# Patient Record
Sex: FEMALE | Race: WHITE | NOT HISPANIC OR LATINO | Employment: OTHER | URBAN - METROPOLITAN AREA
[De-identification: names, ages, dates, MRNs, and addresses within clinical notes are randomized per-mention and may not be internally consistent; named-entity substitution may affect disease eponyms.]

---

## 2017-12-22 ENCOUNTER — OFFICE VISIT (OUTPATIENT)
Dept: URGENT CARE | Facility: CLINIC | Age: 82
End: 2017-12-22
Payer: MEDICARE

## 2017-12-22 VITALS
RESPIRATION RATE: 16 BRPM | DIASTOLIC BLOOD PRESSURE: 66 MMHG | WEIGHT: 134 LBS | BODY MASS INDEX: 27.01 KG/M2 | TEMPERATURE: 97 F | OXYGEN SATURATION: 96 % | HEIGHT: 59 IN | SYSTOLIC BLOOD PRESSURE: 169 MMHG | HEART RATE: 68 BPM

## 2017-12-22 DIAGNOSIS — J04.0 LARYNGITIS: Primary | ICD-10-CM

## 2017-12-22 DIAGNOSIS — R05.9 COUGH: ICD-10-CM

## 2017-12-22 LAB
CTP QC/QA: YES
FLUAV AG NPH QL: NEGATIVE
FLUBV AG NPH QL: NEGATIVE

## 2017-12-22 PROCEDURE — 96372 THER/PROPH/DIAG INJ SC/IM: CPT | Mod: S$GLB,,, | Performed by: FAMILY MEDICINE

## 2017-12-22 PROCEDURE — 87804 INFLUENZA ASSAY W/OPTIC: CPT | Mod: QW,S$GLB,, | Performed by: FAMILY MEDICINE

## 2017-12-22 PROCEDURE — 99203 OFFICE O/P NEW LOW 30 MIN: CPT | Mod: 25,S$GLB,, | Performed by: FAMILY MEDICINE

## 2017-12-22 RX ORDER — SIMVASTATIN 20 MG/1
20 TABLET, FILM COATED ORAL NIGHTLY
COMMUNITY

## 2017-12-22 RX ORDER — ACETAMINOPHEN 500 MG
500 TABLET ORAL EVERY 6 HOURS PRN
COMMUNITY

## 2017-12-22 RX ORDER — LEVOTHYROXINE SODIUM 25 UG/1
25 TABLET ORAL DAILY
COMMUNITY

## 2017-12-22 RX ORDER — DEXAMETHASONE SODIUM PHOSPHATE 100 MG/10ML
6 INJECTION INTRAMUSCULAR; INTRAVENOUS
Status: COMPLETED | OUTPATIENT
Start: 2017-12-22 | End: 2017-12-22

## 2017-12-22 RX ORDER — LOSARTAN POTASSIUM 100 MG/1
100 TABLET ORAL DAILY
COMMUNITY

## 2017-12-22 RX ORDER — DOXEPIN HYDROCHLORIDE 10 MG/ML
SOLUTION ORAL NIGHTLY
COMMUNITY

## 2017-12-22 RX ORDER — PREDNISONE 10 MG/1
10 TABLET ORAL DAILY
Qty: 7 TABLET | Refills: 0 | Status: SHIPPED | OUTPATIENT
Start: 2017-12-22 | End: 2017-12-29

## 2017-12-22 RX ORDER — ATENOLOL 25 MG/1
25 TABLET ORAL DAILY
COMMUNITY

## 2017-12-22 RX ADMIN — DEXAMETHASONE SODIUM PHOSPHATE 6 MG: 100 INJECTION INTRAMUSCULAR; INTRAVENOUS at 10:12

## 2017-12-22 NOTE — PATIENT INSTRUCTIONS
Laryngitis    Laryngitis is a swelling of the tissues around the vocal cords. Symptoms include a hoarse (scratchy) voice. The voice may be lost completely. It may be caused by a viral illness, such as a head or chest cold. It may also be due to overuse and strain of the voice. Smoking, drinking alcohol, acid reflux, allergies, or inhaling harsh chemicals may also lead to symptoms. This condition will usually resolve in 1-2 weeks.  Home care  · Rest your voice until it recovers. Talk as little as possible. If your symptoms are severe, rest at home for a day or so.  · Breathing cool steam from a humidifier/vaporizer or in a steamy shower may be helpful.  · Drink plenty of fluids to stay well hydrated.  · Do not smoke  Follow-up care  Follow up with your healthcare provider or this facility if you have not improved after one week.  When to seek medical advice  Contact your healthcare provider for any of the following:  · Severe pain with swallowing  · Trouble opening mouth  · Neck swelling, neck pain, or trouble moving neck  · Noisy breathing or trouble breathing  · Fever of 100.4°F (38.ºC) or higher, or as directed by your healthcare provider  · Drooling  · Symptoms do not resolve in 2 weeks  Date Last Reviewed: 4/26/2015 © 2000-2017 [employer]. [address] [place] [address], [place] [address]. All rights reserved. This information is not intended as a substitute for professional medical care. Always follow your healthcare professional's instructions.      Follow up with your doctor in a few days as needed.  Return to the urgent care or go to the ER if symptoms get worse.    Matt Miller MD

## 2017-12-22 NOTE — PROGRESS NOTES
"Subjective:       Patient ID: Bhumika Pitt is a 89 y.o. female.    Vitals:  height is 4' 11" (1.499 m) and weight is 60.8 kg (134 lb). Her temperature is 97.2 °F (36.2 °C). Her blood pressure is 169/66 (abnormal) and her pulse is 68. Her respiration is 16 and oxygen saturation is 96%.     Chief Complaint: Cough    Pt states she started with cough and congestion on Sunday and began yesterday with the voice horseness      Cough   This is a new problem. The current episode started in the past 7 days. The problem has been unchanged. The problem occurs hourly. The cough is productive of sputum. Pertinent negatives include no chest pain, chills, ear pain, eye redness, fever, headaches, myalgias, sore throat, shortness of breath or wheezing. Nothing aggravates the symptoms. She has tried nothing for the symptoms.     Review of Systems   Constitution: Negative for chills, fever and malaise/fatigue.   HENT: Positive for hoarse voice. Negative for congestion, ear pain and sore throat.    Eyes: Negative for discharge and redness.   Cardiovascular: Negative for chest pain, dyspnea on exertion and leg swelling.   Respiratory: Positive for cough and sputum production. Negative for shortness of breath and wheezing.    Musculoskeletal: Negative for myalgias.   Gastrointestinal: Negative for abdominal pain and nausea.   Neurological: Negative for headaches.       Objective:      Physical Exam   Constitutional: She is oriented to person, place, and time. She appears well-developed and well-nourished.   HENT:   Head: Normocephalic and atraumatic.   Right Ear: External ear normal.   Left Ear: External ear normal.   Mouth/Throat: Oropharynx is clear and moist.   Eyes: EOM are normal. Pupils are equal, round, and reactive to light.   Neck: Normal range of motion. Neck supple. No JVD present. No tracheal deviation present. No thyromegaly present.   Cardiovascular: Normal rate, regular rhythm and normal heart sounds.  Exam reveals no " gallop and no friction rub.    No murmur heard.  Pulmonary/Chest: Breath sounds normal. No respiratory distress. She has no wheezes. She has no rales. She exhibits no tenderness.   Abdominal: Soft. Bowel sounds are normal. She exhibits no distension and no mass. There is no tenderness. There is no rebound and no guarding. No hernia.   Musculoskeletal: Normal range of motion. She exhibits no edema, tenderness or deformity.   Lymphadenopathy:     She has no cervical adenopathy.   Neurological: She is alert and oriented to person, place, and time. She displays normal reflexes. No cranial nerve deficit. She exhibits normal muscle tone. Coordination normal.   Skin: Skin is warm. Capillary refill takes less than 2 seconds. No rash noted. No erythema. No pallor.   Psychiatric: She has a normal mood and affect. Her behavior is normal. Judgment and thought content normal.   Vitals reviewed.      Assessment:       1. Laryngitis    2. Cough        Plan:         Laryngitis  -     dexamethasone injection 6 mg; Inject 0.6 mLs (6 mg total) into the muscle one time.  -     predniSONE (DELTASONE) 10 MG tablet; Take 1 tablet (10 mg total) by mouth once daily.  Dispense: 7 tablet; Refill: 0    Cough  -     POCT Influenza A/B      Follow up with your doctor in a few days as needed.  Return to the urgent care or go to the ER if symptoms get worse.    Matt Miller MD

## 2018-11-30 ENCOUNTER — OFFICE VISIT (OUTPATIENT)
Dept: URGENT CARE | Facility: CLINIC | Age: 83
End: 2018-11-30
Payer: MEDICARE

## 2018-11-30 VITALS
HEART RATE: 73 BPM | WEIGHT: 133 LBS | TEMPERATURE: 99 F | HEIGHT: 59 IN | OXYGEN SATURATION: 95 % | SYSTOLIC BLOOD PRESSURE: 132 MMHG | BODY MASS INDEX: 26.81 KG/M2 | RESPIRATION RATE: 19 BRPM | DIASTOLIC BLOOD PRESSURE: 69 MMHG

## 2018-11-30 DIAGNOSIS — R09.81 SINUS CONGESTION: ICD-10-CM

## 2018-11-30 DIAGNOSIS — J06.9 UPPER RESPIRATORY TRACT INFECTION, UNSPECIFIED TYPE: Primary | ICD-10-CM

## 2018-11-30 PROCEDURE — 99214 OFFICE O/P EST MOD 30 MIN: CPT | Mod: 25,S$GLB,, | Performed by: FAMILY MEDICINE

## 2018-11-30 PROCEDURE — 96372 THER/PROPH/DIAG INJ SC/IM: CPT | Mod: S$GLB,,, | Performed by: FAMILY MEDICINE

## 2018-11-30 RX ORDER — DEXAMETHASONE SODIUM PHOSPHATE 100 MG/10ML
6 INJECTION INTRAMUSCULAR; INTRAVENOUS
Status: COMPLETED | OUTPATIENT
Start: 2018-11-30 | End: 2018-11-30

## 2018-11-30 RX ORDER — AZELASTINE 1 MG/ML
1 SPRAY, METERED NASAL 2 TIMES DAILY
Qty: 30 ML | Refills: 0 | Status: SHIPPED | OUTPATIENT
Start: 2018-11-30 | End: 2018-12-30

## 2018-11-30 RX ADMIN — DEXAMETHASONE SODIUM PHOSPHATE 6 MG: 100 INJECTION INTRAMUSCULAR; INTRAVENOUS at 03:11

## 2018-11-30 NOTE — PATIENT INSTRUCTIONS
Preventing Common Respiratory Infections  Respiratory infections such as colds and influenza (the flu) are common in winter. These infections are often caused by viruses. They may share some symptoms, but not all respiratory infections are the same. Some make you more sick than others. You can take steps to prevent common respiratory infections. And if you get sick, you can take care of yourself to keep the infection from getting worse.    What is a cold?  · Symptoms include runny nose, coughing and sneezing, and sore throat. Cold symptoms tend to be milder than flu symptoms.  · Symptoms tend to come on slowly. They last for a few days to about a week.  · With a cold, you can still do most of the things you usually do.  What is the flu?  · Symptoms include fever, headache, fatigue, cough, sore throat, runny nose, and muscle aches. Children may have upset stomach and vomiting, but adults usually dont.  · Symptoms tend to come on quickly. Some, such as fatigue and cough, can last a few weeks.  · With the flu, you may feel worn out and not able to do normal activities.  · Its most likely NOT the flu if an adult has vomiting or diarrhea for a day or two. This so-called stomach flu is probably a GI (gastrointestinal) infection.  When the infection gets worse  Without proper care, a respiratory infection can get worse. It can lead to serious complications and death. If you arent getting better, call your healthcare provider. Complications can include:  · Bronchitis (infection of the airways that leads to shortness of breath and coughing up thick yellow or green mucus)  · Pneumonia (infection of the lungs in which fluid and mucus settle in the lungs, making breathing difficult)  · Worsening of chronic conditions such as heart failure, chronic lung disease, asthma, or diabetes  · Severe dehydration (loss of fluids)  · Sinus problems  · Ear infections   Get a flu vaccine  A flu vaccine protects you from influenza  (but not other colds or infections). Get a vaccine each fall, before flu season starts. This can be done at a clinic, healthcare providers office, drugstore, senior center, or through your workplace.  Get pneumococcal vaccines  Pneumonia can be a complication of influenza. There are 2 pneumococcal pneumonia vaccines that protect against many types of pneumonia. Talk with your healthcare provider about these important vaccines.   Keep germs from spreading  No one likes getting sick. To protect yourself and others from cold and flu germs:  · Wash your hands often. Use alcohol-based hand  when you dont have access to soap and water.  · Dont touch your eyes, nose, and mouth. This may help you keep germs out of your body.  · Try to avoid people with respiratory infections. You may want to stay out of crowds during flu season (winter).  · Ask your healthcare provider if you should get a pneumonia vaccination.  How to wash your hands  · Use warm water and plenty of soap. Work up a good lather.  · Clean your whole hand, under your nails, between your fingers, and up your wrists. Wash for at least 15 to 20 seconds. Dont just wipe--rub well.  · Rinse. Let the water run down your fingertips, not up your wrists.  · In a public restroom, use a paper towel to turn off the faucet and open the door.   Date Last Reviewed: 12/1/2016  © 7087-9983 [employer]. [address] [place] [address], [place] [address]. All rights reserved. This information is not intended as a substitute for professional medical care. Always follow your healthcare professional's instructions.      Follow up with your doctor in a few days as needed.  Return to the urgent care or go to the ER if symptoms get worse.    Matt Miller MD

## 2018-11-30 NOTE — PROGRESS NOTES
"Subjective:       Patient ID: Bhumika Pitt is a 89 y.o. female.    Vitals:  height is 4' 11" (1.499 m) and weight is 60.3 kg (133 lb). Her oral temperature is 98.5 °F (36.9 °C). Her blood pressure is 132/69 and her pulse is 73. Her respiration is 19 and oxygen saturation is 95%.     Chief Complaint: URI    URI    This is a new problem. The current episode started 1 to 4 weeks ago (1-2 weeks). The problem has been gradually worsening. There has been no fever. Associated symptoms include congestion, coughing and sneezing. Pertinent negatives include no abdominal pain, chest pain, diarrhea, dysuria, ear pain, headaches, joint pain, joint swelling, nausea, neck pain, plugged ear sensation, rash, rhinorrhea, sinus pain, sore throat, swollen glands, vomiting or wheezing. She has tried antihistamine and acetaminophen (nasal spray) for the symptoms. The treatment provided no relief.       Constitution: Negative for chills, sweating, fatigue and fever.   HENT: Positive for congestion. Negative for ear pain, sinus pain, sinus pressure, sore throat and voice change.    Neck: Negative for neck pain and painful lymph nodes.   Cardiovascular: Negative for chest pain.   Eyes: Negative for eye redness.   Respiratory: Positive for cough. Negative for chest tightness, sputum production, bloody sputum, COPD, shortness of breath, stridor, wheezing and asthma.    Gastrointestinal: Negative for abdominal pain, nausea, vomiting and diarrhea.   Genitourinary: Negative for dysuria.   Musculoskeletal: Negative for muscle ache.   Skin: Negative for rash and erythema.   Allergic/Immunologic: Positive for sneezing. Negative for seasonal allergies and asthma.   Neurological: Negative for headaches.   Hematologic/Lymphatic: Negative for swollen lymph nodes.       Objective:      Physical Exam   Constitutional: She is oriented to person, place, and time. She appears well-developed and well-nourished.   HENT:   Head: Normocephalic and atraumatic. "   Eyes: EOM are normal. Pupils are equal, round, and reactive to light.   Neck: Normal range of motion. Neck supple.   Cardiovascular: Normal rate, regular rhythm and normal heart sounds.   No murmur heard.  Pulmonary/Chest: Breath sounds normal. No respiratory distress. She has no wheezes. She has no rales.   Abdominal: Soft. Bowel sounds are normal. She exhibits no distension. There is no tenderness. There is no rebound and no guarding.   Musculoskeletal: Normal range of motion. She exhibits no edema, tenderness or deformity.   Lymphadenopathy:     She has no cervical adenopathy.   Neurological: She is alert and oriented to person, place, and time. No cranial nerve deficit. Coordination normal.   Skin: Skin is warm. Capillary refill takes less than 2 seconds. No rash noted. No erythema. No pallor.   Psychiatric: She has a normal mood and affect. Her behavior is normal. Judgment and thought content normal.   Vitals reviewed.      Assessment:       1. Upper respiratory tract infection, unspecified type    2. Sinus congestion        Plan:         Upper respiratory tract infection, unspecified type    Sinus congestion  -     dexamethasone injection 6 mg  -     azelastine (ASTELIN) 137 mcg (0.1 %) nasal spray; 1 spray (137 mcg total) by Nasal route 2 (two) times daily.  Dispense: 30 mL; Refill: 0          Patient Instructions       Preventing Common Respiratory Infections  Respiratory infections such as colds and influenza (the flu) are common in winter. These infections are often caused by viruses. They may share some symptoms, but not all respiratory infections are the same. Some make you more sick than others. You can take steps to prevent common respiratory infections. And if you get sick, you can take care of yourself to keep the infection from getting worse.    What is a cold?  · Symptoms include runny nose, coughing and sneezing, and sore throat. Cold symptoms tend to be milder than flu symptoms.  · Symptoms  tend to come on slowly. They last for a few days to about a week.  · With a cold, you can still do most of the things you usually do.  What is the flu?  · Symptoms include fever, headache, fatigue, cough, sore throat, runny nose, and muscle aches. Children may have upset stomach and vomiting, but adults usually dont.  · Symptoms tend to come on quickly. Some, such as fatigue and cough, can last a few weeks.  · With the flu, you may feel worn out and not able to do normal activities.  · Its most likely NOT the flu if an adult has vomiting or diarrhea for a day or two. This so-called stomach flu is probably a GI (gastrointestinal) infection.  When the infection gets worse  Without proper care, a respiratory infection can get worse. It can lead to serious complications and death. If you arent getting better, call your healthcare provider. Complications can include:  · Bronchitis (infection of the airways that leads to shortness of breath and coughing up thick yellow or green mucus)  · Pneumonia (infection of the lungs in which fluid and mucus settle in the lungs, making breathing difficult)  · Worsening of chronic conditions such as heart failure, chronic lung disease, asthma, or diabetes  · Severe dehydration (loss of fluids)  · Sinus problems  · Ear infections   Get a flu vaccine  A flu vaccine protects you from influenza (but not other colds or infections). Get a vaccine each fall, before flu season starts. This can be done at a clinic, healthcare providers office, drugstore, senior center, or through your workplace.  Get pneumococcal vaccines  Pneumonia can be a complication of influenza. There are 2 pneumococcal pneumonia vaccines that protect against many types of pneumonia. Talk with your healthcare provider about these important vaccines.   Keep germs from spreading  No one likes getting sick. To protect yourself and others from cold and flu germs:  · Wash your hands often. Use alcohol-based hand   when you dont have access to soap and water.  · Dont touch your eyes, nose, and mouth. This may help you keep germs out of your body.  · Try to avoid people with respiratory infections. You may want to stay out of crowds during flu season (winter).  · Ask your healthcare provider if you should get a pneumonia vaccination.  How to wash your hands  · Use warm water and plenty of soap. Work up a good lather.  · Clean your whole hand, under your nails, between your fingers, and up your wrists. Wash for at least 15 to 20 seconds. Dont just wipe--rub well.  · Rinse. Let the water run down your fingertips, not up your wrists.  · In a public restroom, use a paper towel to turn off the faucet and open the door.   Date Last Reviewed: 12/1/2016  © 6481-1045 [employer]. [address] [place] [address]. All rights reserved. This information is not intended as a substitute for professional medical care. Always follow your healthcare professional's instructions.      Follow up with your doctor in a few days as needed.  Return to the urgent care or go to the ER if symptoms get worse.    Matt Miller MD

## 2019-05-27 ENCOUNTER — OFFICE VISIT (OUTPATIENT)
Dept: URGENT CARE | Facility: CLINIC | Age: 84
End: 2019-05-27
Payer: MEDICARE

## 2019-05-27 VITALS
BODY MASS INDEX: 26.11 KG/M2 | RESPIRATION RATE: 19 BRPM | HEIGHT: 60 IN | WEIGHT: 133 LBS | SYSTOLIC BLOOD PRESSURE: 160 MMHG | OXYGEN SATURATION: 95 % | DIASTOLIC BLOOD PRESSURE: 90 MMHG | TEMPERATURE: 98 F | HEART RATE: 65 BPM

## 2019-05-27 DIAGNOSIS — R09.82 POST-NASAL DRIP: Primary | ICD-10-CM

## 2019-05-27 PROCEDURE — 96372 PR INJECTION,THERAP/PROPH/DIAG2ST, IM OR SUBCUT: ICD-10-PCS | Mod: S$GLB,,, | Performed by: PHYSICIAN ASSISTANT

## 2019-05-27 PROCEDURE — 99213 OFFICE O/P EST LOW 20 MIN: CPT | Mod: 25,S$GLB,, | Performed by: PHYSICIAN ASSISTANT

## 2019-05-27 PROCEDURE — 96372 THER/PROPH/DIAG INJ SC/IM: CPT | Mod: S$GLB,,, | Performed by: PHYSICIAN ASSISTANT

## 2019-05-27 PROCEDURE — 99213 PR OFFICE/OUTPT VISIT, EST, LEVL III, 20-29 MIN: ICD-10-PCS | Mod: 25,S$GLB,, | Performed by: PHYSICIAN ASSISTANT

## 2019-05-27 RX ORDER — AZELASTINE 1 MG/ML
2 SPRAY, METERED NASAL 2 TIMES DAILY PRN
Qty: 30 ML | Refills: 0 | Status: SHIPPED | OUTPATIENT
Start: 2019-05-27 | End: 2019-07-28

## 2019-05-27 RX ORDER — DEXAMETHASONE SODIUM PHOSPHATE 4 MG/ML
6 INJECTION, SOLUTION INTRA-ARTICULAR; INTRALESIONAL; INTRAMUSCULAR; INTRAVENOUS; SOFT TISSUE ONCE
Status: COMPLETED | OUTPATIENT
Start: 2019-05-27 | End: 2019-05-27

## 2019-05-27 RX ADMIN — DEXAMETHASONE SODIUM PHOSPHATE 6 MG: 4 INJECTION, SOLUTION INTRA-ARTICULAR; INTRALESIONAL; INTRAMUSCULAR; INTRAVENOUS; SOFT TISSUE at 11:05

## 2019-05-27 NOTE — PROGRESS NOTES
Subjective:       Patient ID: Bhumika Pitt is a 89 y.o. female.    Vitals:  height is 5' (1.524 m) and weight is 60.3 kg (133 lb). Her oral temperature is 97.8 °F (36.6 °C). Her blood pressure is 160/90 (abnormal) and her pulse is 65. Her respiration is 19 and oxygen saturation is 95%.     Chief Complaint: Sinusitis    Sinusitis   This is a new problem. The current episode started yesterday. The problem has been gradually worsening since onset. There has been no fever. Her pain is at a severity of 0/10. She is experiencing no pain. Associated symptoms include congestion, coughing, ear pain and sinus pressure. Pertinent negatives include no chills, diaphoresis, headaches, hoarse voice, neck pain, shortness of breath, sneezing, sore throat or swollen glands. Past treatments include saline sprays.       Constitution: Negative for chills, sweating, fatigue and fever.   HENT: Positive for ear pain, congestion, postnasal drip, sinus pain and sinus pressure. Negative for sore throat and voice change.    Neck: Negative for neck pain and painful lymph nodes.   Eyes: Negative for eye redness.   Respiratory: Positive for cough. Negative for chest tightness, sputum production, bloody sputum, COPD, shortness of breath, stridor, wheezing and asthma.    Gastrointestinal: Negative for nausea and vomiting.   Musculoskeletal: Negative for muscle ache.   Skin: Negative for rash.   Allergic/Immunologic: Negative for seasonal allergies, asthma and sneezing.   Neurological: Negative for headaches.   Hematologic/Lymphatic: Negative for swollen lymph nodes.       Objective:      Physical Exam   Constitutional: She is oriented to person, place, and time. She appears well-developed and well-nourished. She is cooperative.  Non-toxic appearance. She does not appear ill. No distress.   HENT:   Head: Normocephalic and atraumatic.   Right Ear: Hearing, tympanic membrane, external ear and ear canal normal.   Left Ear: Hearing, tympanic membrane,  external ear and ear canal normal.   Nose: Nose normal. No mucosal edema, rhinorrhea or nasal deformity. No epistaxis. Right sinus exhibits no maxillary sinus tenderness and no frontal sinus tenderness. Left sinus exhibits no maxillary sinus tenderness and no frontal sinus tenderness.   Mouth/Throat: Uvula is midline and mucous membranes are normal. No trismus in the jaw. Normal dentition. No uvula swelling. No posterior oropharyngeal erythema.   Posterior oropharynx with cobblestoning. No significant oropharyngeal erythema or edema. Airway patent without stridor. Nasopharynx dry. Bilateral TMs and ear canals wnl without effusion, erythema, perforation, dullness, or loss of landmarks.   Eyes: Conjunctivae and lids are normal. Right eye exhibits no discharge. Left eye exhibits no discharge. No scleral icterus.   Sclera clear bilat   Neck: Trachea normal, normal range of motion, full passive range of motion without pain and phonation normal. Neck supple.   Cardiovascular: Normal rate, regular rhythm, normal heart sounds, intact distal pulses and normal pulses.   Pulmonary/Chest: Effort normal and breath sounds normal. No respiratory distress.   Abdominal: Soft. Normal appearance and bowel sounds are normal. She exhibits no distension, no pulsatile midline mass and no mass. There is no tenderness.   Musculoskeletal: Normal range of motion. She exhibits no edema or deformity.   Neurological: She is alert and oriented to person, place, and time. She exhibits normal muscle tone. Coordination normal.   Skin: Skin is warm, dry and intact. She is not diaphoretic. No pallor.   Psychiatric: She has a normal mood and affect. Her speech is normal and behavior is normal. Judgment and thought content normal. Cognition and memory are normal.   Nursing note and vitals reviewed.      Assessment:       1. Post-nasal drip        Plan:         Chronic rhinitis with post nasal drip. States 6 dexamethasone helps with symptoms. I feel  amenable to her plan. No corticosteroids in >60 days. PCP f/u.     Post-nasal drip    Other orders  -     dexamethasone injection 6 mg  -     azelastine (ASTELIN) 137 mcg (0.1 %) nasal spray; 2 sprays (274 mcg total) by Nasal route 2 (two) times daily as needed for Rhinitis.  Dispense: 30 mL; Refill: 0

## 2019-05-27 NOTE — PATIENT INSTRUCTIONS
Use Astelin as prescribed. Follow-up with your primary care provider for reevaluation should symptoms persist despite treatment. Return to this urgent care if you experience severe headache, if you begin with fever, if any other problems occur.   Sinus Headaches     When using nasal spray, keep your chin down and angle the spray away from center.     Sinus headaches can cause a gnawing pain behind the nose and eyes. The pain most often gets worse in the afternoon and evening. You may also run a fever. Sinus headaches are caused by colds or allergies that make the nasal passages inflamed or infected.  To help prevent sinus headaches:  · Treat colds promptly to keep mucus from backing up.  · Avoid things that trigger sinus problems, such as pollens, dust, smoke, fumes, and strong odors.  · Take allergy medicines as directed by your healthcare provider.  To relieve the pain:  · Keep your sinuses open and free of mucus. Try over-the-counter sinus rinse products.  · Use a nasal decongestant as directed to reduce the inflammation.  · Drink fluids to keep the mucus thinner. This helps it drain more easily. You can also use a humidifier.  · Apply hot packs to the area around your sinuses. Use a hot water bottle.  · See your healthcare provider if your sinus headache lasts more than 2 weeks. You may need medicine for a sinus infection or an exam to check for other headache conditions, like migraines.  Date Last Reviewed: 10/1/2016  © 4127-8862 [employer] [place]. [address] [place] [address]. All rights reserved. This information is not intended as a substitute for professional medical care. Always follow your healthcare professional's instructions.        Allergic Rhinitis  Allergic rhinitis is an allergic reaction that affects the nose, and often the eyes. Its often known as nasal allergies. Nasal allergies are often due to things in the environment that are breathed in. Depending what you are  sensitive to, nasal allergies may occur only during certain seasons. Or they may occur year round. Common indoor allergens include house dust mites, mold, cockroaches, and pet dander. Outdoor allergens include pollen from trees, grasses, and weeds.   Symptoms include a drippy, stuffy, and itchy nose. They also include sneezing and red and itchy eyes. You may feel tired more often. Severe allergies may also affect your breathing and trigger a condition called asthma.   Tests can be done to see what allergens are affecting you. You may be referred to an allergy specialist for testing and further evaluation.  Home care  Your healthcare provider may prescribe medicines to help relieve allergy symptoms. These may include oral medicines, nasal sprays, or eye drops.  Ask your provider for advice on how to avoid substances that you are allergic to. Below are a few tips for each type of allergen.  Pet dander:  · Do not have pets with fur and feathers.  · If you can't avoid having a pet, keep it out of your bedroom and off upholstered furniture.  Pollen:  · When pollen counts are high, keep windows of your car and home closed. If possible, use an air conditioner instead.  · Wear a filter mask when mowing or doing yard work.  House dust mites:  · Wash bedding every week in warm water and detergent and dry on a hot setting.  · Cover the mattress, box spring, and pillows with allergy covers.   · If possible, sleep in a room with no carpet, curtains, or upholstered furniture.  Cockroaches:  · Store food in sealed containers.  · Remove garbage from the home promptly.  · Fix water leaks  Mold:  · Keep humidity low by using a dehumidifier or air conditioner. Keep the dehumidifier and air conditioner clean and free of mold.  · Clean moldy areas with bleach and water.  In general:  · Vacuum once or twice a week. If possible, use a vacuum with a high-efficiency particulate air (HEPA) filter.  · Do not smoke. Avoid cigarette smoke.  Cigarette smoke is an irritant that can make symptoms worse.  Follow-up care  Follow up as advised by the healthcare provider or our staff. If you were referred to an allergy specialist, make this appointment promptly.  When to seek medical advice  Call your healthcare provider right away if the following occur:  · Coughing or wheezing  · Fever greater than 100.4°F (38°C)  · Hives (raised red bumps)  · Continuing symptoms, new symptoms, or worsening symptoms  Call 911 right away if you have:  · Trouble breathing  · Severe swelling of the face or severe itching of the eyes or mouth  Date Last Reviewed: 3/1/2017  © 0656-3540 [employer]. [address] [place] [address], [place] [address]. All rights reserved. This information is not intended as a substitute for professional medical care. Always follow your healthcare professional's instructions.

## 2019-07-28 ENCOUNTER — OFFICE VISIT (OUTPATIENT)
Dept: URGENT CARE | Facility: CLINIC | Age: 84
End: 2019-07-28
Payer: MEDICARE

## 2019-07-28 VITALS
OXYGEN SATURATION: 97 % | TEMPERATURE: 98 F | WEIGHT: 133 LBS | DIASTOLIC BLOOD PRESSURE: 67 MMHG | RESPIRATION RATE: 18 BRPM | HEART RATE: 70 BPM | SYSTOLIC BLOOD PRESSURE: 148 MMHG | BODY MASS INDEX: 26.11 KG/M2 | HEIGHT: 60 IN

## 2019-07-28 DIAGNOSIS — R09.81 NASAL SINUS CONGESTION: ICD-10-CM

## 2019-07-28 DIAGNOSIS — H60.92 OTITIS EXTERNA OF LEFT EAR, UNSPECIFIED CHRONICITY, UNSPECIFIED TYPE: Primary | ICD-10-CM

## 2019-07-28 PROCEDURE — 99214 PR OFFICE/OUTPT VISIT, EST, LEVL IV, 30-39 MIN: ICD-10-PCS | Mod: S$GLB,,, | Performed by: PHYSICIAN ASSISTANT

## 2019-07-28 PROCEDURE — 99214 OFFICE O/P EST MOD 30 MIN: CPT | Mod: S$GLB,,, | Performed by: PHYSICIAN ASSISTANT

## 2019-07-28 RX ORDER — AZELASTINE 1 MG/ML
1 SPRAY, METERED NASAL 2 TIMES DAILY
Qty: 30 ML | Refills: 0 | Status: SHIPPED | OUTPATIENT
Start: 2019-07-28 | End: 2019-08-27

## 2019-07-28 RX ORDER — OFLOXACIN 3 MG/ML
5 SOLUTION AURICULAR (OTIC) DAILY
Qty: 5 ML | Refills: 0 | Status: SHIPPED | OUTPATIENT
Start: 2019-07-28 | End: 2019-11-08

## 2019-07-28 RX ORDER — PREDNISONE 10 MG/1
10 TABLET ORAL DAILY
Qty: 3 TABLET | Refills: 0 | Status: SHIPPED | OUTPATIENT
Start: 2019-07-28 | End: 2019-07-31

## 2019-07-28 NOTE — PROGRESS NOTES
Subjective:       Patient ID: Bhumika Pitt is a 89 y.o. female.    Vitals:  height is 5' (1.524 m) and weight is 60.3 kg (133 lb). Her temperature is 98.2 °F (36.8 °C). Her blood pressure is 148/67 (abnormal) and her pulse is 70. Her respiration is 18 and oxygen saturation is 97%.     Chief Complaint: Sinus Problem (ear congestion, post nasal drip x 1 day )    Patient reports nasal congestion and sinus pressure for 1 day.  She has been using nasal spray that was given to her fear 0 with little relief.  She reports pressure in bilateral ears but denies any pain or discharge. She denies sore throat, fever, abdominal pain, nausea or vomiting, runny nose, cough or shortness of breath.  Patient all also takes daily Zyrtec.  She is requesting another steroid injection since that helped her when she had the symptoms in May of this year.    Sinus Problem   This is a new problem. The current episode started yesterday. The problem is unchanged. There has been no fever. Her pain is at a severity of 5/10. The pain is mild. Associated symptoms include congestion, ear pain and sinus pressure. Pertinent negatives include no chills, coughing, diaphoresis, headaches, shortness of breath or sore throat. Treatments tried: Astelin. The treatment provided no relief.       Constitution: Negative for chills, sweating, fatigue and fever.   HENT: Positive for ear pain, congestion and sinus pressure. Negative for ear discharge, sinus pain, sore throat and voice change.    Neck: Negative for painful lymph nodes.   Eyes: Negative for eye redness.   Respiratory: Negative for chest tightness, cough, sputum production, bloody sputum, COPD, shortness of breath, stridor, wheezing and asthma.    Gastrointestinal: Negative for abdominal pain, nausea and vomiting.   Musculoskeletal: Negative for muscle ache.   Skin: Negative for rash.   Allergic/Immunologic: Negative for seasonal allergies and asthma.   Neurological: Negative for headaches.    Hematologic/Lymphatic: Negative for swollen lymph nodes.       Objective:      Physical Exam   Constitutional: She is oriented to person, place, and time. She appears well-developed and well-nourished. She is cooperative.  Non-toxic appearance. She does not appear ill. No distress.   HENT:   Head: Normocephalic and atraumatic.   Right Ear: Hearing, tympanic membrane, external ear and ear canal normal.   Left Ear: Hearing and external ear normal. No swelling or tenderness. A middle ear effusion is present.   Nose: Mucosal edema present. No rhinorrhea or nasal deformity. No epistaxis. Right sinus exhibits no maxillary sinus tenderness and no frontal sinus tenderness. Left sinus exhibits no maxillary sinus tenderness and no frontal sinus tenderness.   Mouth/Throat: Uvula is midline, oropharynx is clear and moist and mucous membranes are normal. No trismus in the jaw. Normal dentition. No uvula swelling. No oropharyngeal exudate or posterior oropharyngeal erythema.       Purulent material in left canal.    Eyes: Conjunctivae and lids are normal. No scleral icterus.   Sclera clear bilat   Neck: Trachea normal, full passive range of motion without pain and phonation normal. Neck supple.   Cardiovascular: Normal rate, regular rhythm, normal heart sounds, intact distal pulses and normal pulses.   Pulmonary/Chest: Effort normal and breath sounds normal. No respiratory distress.   Musculoskeletal: Normal range of motion. She exhibits no edema or deformity.   Lymphadenopathy:     She has no cervical adenopathy.   Neurological: She is alert and oriented to person, place, and time. She exhibits normal muscle tone. Coordination normal.   Skin: Skin is warm, dry and intact. She is not diaphoretic. No pallor.   Psychiatric: She has a normal mood and affect. Her speech is normal and behavior is normal. Judgment and thought content normal. Cognition and memory are normal.   Nursing note and vitals reviewed.      Assessment:        1. Otitis externa of left ear, unspecified chronicity, unspecified type    2. Nasal sinus congestion        Plan:         Otitis externa of left ear, unspecified chronicity, unspecified type  -     ofloxacin (FLOXIN) 0.3 % otic solution; Place 5 drops into the left ear once daily.  Dispense: 5 mL; Refill: 0    Nasal sinus congestion  -     predniSONE (DELTASONE) 10 MG tablet; Take 1 tablet (10 mg total) by mouth once daily. for 3 days  Dispense: 3 tablet; Refill: 0  -     azelastine (ASTELIN) 137 mcg (0.1 %) nasal spray; 1 spray (137 mcg total) by Nasal route 2 (two) times daily.  Dispense: 30 mL; Refill: 0    Continue taking daily Zyrtec. Explained to pt she is unable to get another steroid injection since she had one within the last couple months. Pt should f/u with PCP if symptoms do not improve within 5-7 days.       General Instructions for URI:  Below are suggestions for symptomatic relief:  -Tylenol every 4 hours OR ibuprofen every 6 hours as needed for pain/fever.  -Salt water gargles to soothe throat pain.  -Chloroseptic spray also helps to numb throat pain.  -Nasal saline spray reduces inflammation and dryness.  -Warm face compresses to help with facial sinus pain/pressure.  -Vicks vapor rub at night.  -Flonase OTC or Nasacort OTC for nasal congestion.  -Simple foods like chicken noodle soup.  -Delsym helps with coughing at night  -Zyrtec/Claritin during the day & Benadryl at night may help with allergies.  If you DO NOT have Hypertension or any history of palpitations, it is ok to take over the counter Sudafed or Mucinex D  or Allegra-D or Claritin-D or Zyrtec-D.  If you do take one of the above, it is ok to combine that with plain over the counter Mucinex or Allegra or Claritin or  Zyrtec. If, for example, you are taking Zyrtec -D, you can combine that with Mucinex, but not Mucinex-D. If you are  taking Mucinex-D, you can combine that with plain Allegra or Claritin or Zyrtec.  If you DO have  Hypertension or palpitations, it is safe to take Coricidin HBP for relief of sinus symptoms.  Please follow up with your primary care provider within 2-5 days if your signs and symptoms have not resolved or  worsen.  If your condition worsens or fails to improve we recommend that you receive another evaluation at the emergency  room immediately or contact your primary medical clinic to discuss your concerns.  You must understand that you have received an Urgent Care treatment only and that you may be released before all of  your medical problems are known or treated. You, the patient, will arrange for follow up care as instructed.      External Ear Infection (Adult)    External otitis (also called swimmers ear) is an infection in the ear canal. It is often caused by bacteria or fungus. It can occur a few days after water gets trapped in the ear canal (from swimming or bathing). It can also occur after cleaning too deeply in the ear canal with a cotton swab or other object. Sometimes, hair care products get into the ear canal and cause this problem.  Symptoms can include pain, fever, itching, redness, drainage, or swelling of the ear canal. Temporary hearing loss may also occur.  Home care  · Do not try to clean the ear canal. This can push pus and bacteria deeper into the canal.  · Use prescribed ear drops as directed. These help reduce swelling and fight the infection. If an ear wick was placed in the ear canal, apply drops right onto the end of the wick. The wick will draw the medication into the ear canal even if it is swollen closed.  · A cotton ball may be loosely placed in the outer ear to absorb any drainage.  · You may use acetaminophen or ibuprofen to control pain, unless another medication was prescribed. Note: If you have chronic liver or kidney disease or ever had a stomach ulcer or GI bleeding, talk to your health care provider before taking any of these medications.  · Do not allow water to get into  your ear when bathing. Also, avoid swimming until the infection has cleared.  Prevention  · Keep your ears dry. This helps lower the risk of infection. Dry your ears with a towel or hair dryer after getting wet. Also, use ear plugs when swimming.  · Do not stick any objects in the ear to remove wax.  · If you feel water trapped in your ear, use ear drops right away. You can get these drops over the counter at most drugstores. They work by removing water from the ear canal.  Follow-up care  Follow up with your health care provider in one week, or as advised.  When to seek medical advice  Call your health care provider right away if any of these occur:  · Ear pain becomes worse or doesnt improve after 3 days of treatment  · Redness or swelling of the outer ear occurs or gets worse  · Headache  · Painful or stiff neck  · Drowsiness or confusion  · Fever of 100.4ºF (38ºC) or higher, or as directed by your health care provider  · Seizure  Date Last Reviewed: 3/22/2015  © 8826-8679 [employer]. [address] [place] [address]. All rights reserved. This information is not intended as a substitute for professional medical care. Always follow your healthcare professional's instructions.      Please follow up with your Primary care provider within 2-5 days if your signs and symptoms have not resolved or worsen.     If your condition worsens or fails to improve we recommend that you receive another evaluation at the emergency room immediately or contact your primary medical clinic to discuss your concerns.   You must understand that you have received an Urgent Care treatment only and that you may be released before all of your medical problems are known or treated. You, the patient, will arrange for follow up care as instructed.     RED FLAGS/WARNING SYMPTOMS DISCUSSED WITH PATIENT THAT WOULD WARRANT EMERGENT MEDICAL ATTENTION. PATIENT VERBALIZED UNDERSTANDING.

## 2019-07-28 NOTE — PATIENT INSTRUCTIONS
General Instructions for URI:  Below are suggestions for symptomatic relief:  -Tylenol every 4 hours OR ibuprofen every 6 hours as needed for pain/fever.  -Salt water gargles to soothe throat pain.  -Chloroseptic spray also helps to numb throat pain.  -Nasal saline spray reduces inflammation and dryness.  -Warm face compresses to help with facial sinus pain/pressure.  -Vicks vapor rub at night.  -Flonase OTC or Nasacort OTC for nasal congestion.  -Simple foods like chicken noodle soup.  -Delsym helps with coughing at night  -Zyrtec/Claritin during the day & Benadryl at night may help with allergies.  If you DO NOT have Hypertension or any history of palpitations, it is ok to take over the counter Sudafed or Mucinex D  or Allegra-D or Claritin-D or Zyrtec-D.  If you do take one of the above, it is ok to combine that with plain over the counter Mucinex or Allegra or Claritin or  Zyrtec. If, for example, you are taking Zyrtec -D, you can combine that with Mucinex, but not Mucinex-D. If you are  taking Mucinex-D, you can combine that with plain Allegra or Claritin or Zyrtec.  If you DO have Hypertension or palpitations, it is safe to take Coricidin HBP for relief of sinus symptoms.  Please follow up with your primary care provider within 2-5 days if your signs and symptoms have not resolved or  worsen.  If your condition worsens or fails to improve we recommend that you receive another evaluation at the emergency  room immediately or contact your primary medical clinic to discuss your concerns.  You must understand that you have received an Urgent Care treatment only and that you may be released before all of  your medical problems are known or treated. You, the patient, will arrange for follow up care as instructed.      External Ear Infection (Adult)    External otitis (also called swimmers ear) is an infection in the ear canal. It is often caused by bacteria or fungus. It can occur a few days after water gets  trapped in the ear canal (from swimming or bathing). It can also occur after cleaning too deeply in the ear canal with a cotton swab or other object. Sometimes, hair care products get into the ear canal and cause this problem.  Symptoms can include pain, fever, itching, redness, drainage, or swelling of the ear canal. Temporary hearing loss may also occur.  Home care  · Do not try to clean the ear canal. This can push pus and bacteria deeper into the canal.  · Use prescribed ear drops as directed. These help reduce swelling and fight the infection. If an ear wick was placed in the ear canal, apply drops right onto the end of the wick. The wick will draw the medication into the ear canal even if it is swollen closed.  · A cotton ball may be loosely placed in the outer ear to absorb any drainage.  · You may use acetaminophen or ibuprofen to control pain, unless another medication was prescribed. Note: If you have chronic liver or kidney disease or ever had a stomach ulcer or GI bleeding, talk to your health care provider before taking any of these medications.  · Do not allow water to get into your ear when bathing. Also, avoid swimming until the infection has cleared.  Prevention  · Keep your ears dry. This helps lower the risk of infection. Dry your ears with a towel or hair dryer after getting wet. Also, use ear plugs when swimming.  · Do not stick any objects in the ear to remove wax.  · If you feel water trapped in your ear, use ear drops right away. You can get these drops over the counter at most drugstores. They work by removing water from the ear canal.  Follow-up care  Follow up with your health care provider in one week, or as advised.  When to seek medical advice  Call your health care provider right away if any of these occur:  · Ear pain becomes worse or doesnt improve after 3 days of treatment  · Redness or swelling of the outer ear occurs or gets worse  · Headache  · Painful or stiff  neck  · Drowsiness or confusion  · Fever of 100.4ºF (38ºC) or higher, or as directed by your health care provider  · Seizure  Date Last Reviewed: 3/22/2015  © 1639-2810 [employer]. [address] [place] [address], [place] [address]. All rights reserved. This information is not intended as a substitute for professional medical care. Always follow your healthcare professional's instructions.      Please follow up with your Primary care provider within 2-5 days if your signs and symptoms have not resolved or worsen.     If your condition worsens or fails to improve we recommend that you receive another evaluation at the emergency room immediately or contact your primary medical clinic to discuss your concerns.   You must understand that you have received an Urgent Care treatment only and that you may be released before all of your medical problems are known or treated. You, the patient, will arrange for follow up care as instructed.     RED FLAGS/WARNING SYMPTOMS DISCUSSED WITH PATIENT THAT WOULD WARRANT EMERGENT MEDICAL ATTENTION. PATIENT VERBALIZED UNDERSTANDING.

## 2019-11-08 ENCOUNTER — OFFICE VISIT (OUTPATIENT)
Dept: URGENT CARE | Facility: CLINIC | Age: 84
End: 2019-11-08
Payer: MEDICARE

## 2019-11-08 VITALS
TEMPERATURE: 97 F | SYSTOLIC BLOOD PRESSURE: 148 MMHG | HEIGHT: 60 IN | DIASTOLIC BLOOD PRESSURE: 52 MMHG | WEIGHT: 133 LBS | OXYGEN SATURATION: 99 % | RESPIRATION RATE: 16 BRPM | HEART RATE: 66 BPM | BODY MASS INDEX: 26.11 KG/M2

## 2019-11-08 DIAGNOSIS — H60.501 ACUTE OTITIS EXTERNA OF RIGHT EAR, UNSPECIFIED TYPE: Primary | ICD-10-CM

## 2019-11-08 PROCEDURE — 99214 PR OFFICE/OUTPT VISIT, EST, LEVL IV, 30-39 MIN: ICD-10-PCS | Mod: S$GLB,,, | Performed by: NURSE PRACTITIONER

## 2019-11-08 PROCEDURE — 99214 OFFICE O/P EST MOD 30 MIN: CPT | Mod: S$GLB,,, | Performed by: NURSE PRACTITIONER

## 2019-11-08 RX ORDER — NEOMYCIN SULFATE, POLYMYXIN B SULFATE, HYDROCORTISONE 3.5; 10000; 1 MG/ML; [USP'U]/ML; MG/ML
3 SOLUTION/ DROPS AURICULAR (OTIC) 3 TIMES DAILY
Qty: 10 ML | Refills: 0 | Status: SHIPPED | OUTPATIENT
Start: 2019-11-08 | End: 2019-11-18

## 2019-11-08 NOTE — PROGRESS NOTES
Subjective:       Patient ID: Bhumika Pitt is a 89 y.o. female.    Vitals:  height is 5' (1.524 m) and weight is 60.3 kg (133 lb). Her temperature is 97.3 °F (36.3 °C). Her blood pressure is 148/52 (abnormal) and her pulse is 66. Her respiration is 16 and oxygen saturation is 99%.     Chief Complaint: Cerumen Impaction    Pt has some right ear discomfort and what she believes is wax is in her ear  This is a 89-year-old female presents today with complaints of right ear pain since having wax washed out of it this week.  Patient states is very tender to touch.  Denies any fever or chills.  Is currently on steroid drops to help with canal dermatitis per patient.      Constitution: Negative for chills, fatigue and fever.   HENT: Positive for ear pain. Negative for congestion and sore throat.    Neck: Negative for painful lymph nodes.   Cardiovascular: Negative for chest pain and leg swelling.   Eyes: Negative for double vision and blurred vision.   Respiratory: Negative for cough and shortness of breath.    Gastrointestinal: Negative for nausea, vomiting and diarrhea.   Genitourinary: Negative for dysuria, frequency, urgency and history of kidney stones.   Musculoskeletal: Negative for joint pain, joint swelling, muscle cramps and muscle ache.   Skin: Negative for color change, pale, rash, erythema and bruising.   Allergic/Immunologic: Negative for seasonal allergies.   Neurological: Negative for dizziness, history of vertigo, light-headedness, passing out and headaches.   Hematologic/Lymphatic: Negative for swollen lymph nodes.   Psychiatric/Behavioral: Negative for nervous/anxious, sleep disturbance and depression. The patient is not nervous/anxious.        Objective:      Physical Exam   Constitutional: She is oriented to person, place, and time. She appears well-developed and well-nourished.   HENT:   Head: Normocephalic and atraumatic. Head is without abrasion, without contusion and without laceration.   Right  Ear: External ear normal. There is drainage (White exudate to canal), swelling ( moderate) and tenderness. No mastoid tenderness. No hemotympanum.   Left Ear: Hearing, tympanic membrane, external ear and ear canal normal.   Nose: Nose normal.   Mouth/Throat: Oropharynx is clear and moist and mucous membranes are normal.   Tender to tragus  No trismus   Eyes: Pupils are equal, round, and reactive to light. Conjunctivae, EOM and lids are normal.   Neck: Trachea normal, full passive range of motion without pain and phonation normal. Neck supple.   Cardiovascular: Normal rate, regular rhythm and normal heart sounds.   Pulmonary/Chest: Effort normal and breath sounds normal. No stridor. No respiratory distress.   Musculoskeletal: Normal range of motion.   Neurological: She is alert and oriented to person, place, and time.   Skin: Skin is warm, dry, intact and no rash. Capillary refill takes less than 2 seconds. abrasion, burn, bruising, erythema and ecchymosis  Psychiatric: She has a normal mood and affect. Her speech is normal and behavior is normal. Judgment and thought content normal. Cognition and memory are normal.   Nursing note and vitals reviewed.        Assessment:       1. Acute otitis externa of right ear, unspecified type        Plan:         Acute otitis externa of right ear, unspecified type  -     neomycin-polymyxin-hydrocortisone (CORTISPORIN) otic solution; Place 3 drops into the right ear 3 (three) times daily. for 10 days  Dispense: 10 mL; Refill: 0            Patient Instructions     You must understand that you've received an Urgent Care treatment only and that you may be released before all your medical problems are known or treated. You, the patient, will arrange for follow up care as instructed.  If your condition worsens we recommend that you receive another evaluation at the emergency room immediately or contact your primary medical clinics after hours call service to discuss your  concerns.  Please return here or go to the Emergency Department for any concerns or worsening of condition.        External Ear Infection (Adult)    External otitis (also called swimmers ear) is an infection in the ear canal. It is often caused by bacteria or fungus. It can occur a few days after water gets trapped in the ear canal (from swimming or bathing). It can also occur after cleaning too deeply in the ear canal with a cotton swab or other object. Sometimes, hair care products get into the ear canal and cause this problem.  Symptoms can include pain, fever, itching, redness, drainage, or swelling of the ear canal. Temporary hearing loss may also occur.  Home care  · Do not try to clean the ear canal. This can push pus and bacteria deeper into the canal.  · Use prescribed ear drops as directed. These help reduce swelling and fight the infection. If an ear wick was placed in the ear canal, apply drops right onto the end of the wick. The wick will draw the medication into the ear canal even if it is swollen closed.  · A cotton ball may be loosely placed in the outer ear to absorb any drainage.  · You may use acetaminophen or ibuprofen to control pain, unless another medication was prescribed. Note: If you have chronic liver or kidney disease or ever had a stomach ulcer or GI bleeding, talk to your health care provider before taking any of these medications.  · Do not allow water to get into your ear when bathing. Also, avoid swimming until the infection has cleared.  Prevention  · Keep your ears dry. This helps lower the risk of infection. Dry your ears with a towel or hair dryer after getting wet. Also, use ear plugs when swimming.  · Do not stick any objects in the ear to remove wax.  · If you feel water trapped in your ear, use ear drops right away. You can get these drops over the counter at most drugstores. They work by removing water from the ear canal.  Follow-up care  Follow up with your health care  provider in one week, or as advised.  When to seek medical advice  Call your health care provider right away if any of these occur:  · Ear pain becomes worse or doesnt improve after 3 days of treatment  · Redness or swelling of the outer ear occurs or gets worse  · Headache  · Painful or stiff neck  · Drowsiness or confusion  · Fever of 100.4ºF (38ºC) or higher, or as directed by your health care provider  · Seizure  Date Last Reviewed: 3/22/2015  © 4602-8396 [employer]. [address] [place] [address], [place] [address]. All rights reserved. This information is not intended as a substitute for professional medical care. Always follow your healthcare professional's instructions.

## 2019-11-08 NOTE — PATIENT INSTRUCTIONS
You must understand that you've received an Urgent Care treatment only and that you may be released before all your medical problems are known or treated. You, the patient, will arrange for follow up care as instructed.  If your condition worsens we recommend that you receive another evaluation at the emergency room immediately or contact your primary medical clinics after hours call service to discuss your concerns.  Please return here or go to the Emergency Department for any concerns or worsening of condition.        External Ear Infection (Adult)    External otitis (also called swimmers ear) is an infection in the ear canal. It is often caused by bacteria or fungus. It can occur a few days after water gets trapped in the ear canal (from swimming or bathing). It can also occur after cleaning too deeply in the ear canal with a cotton swab or other object. Sometimes, hair care products get into the ear canal and cause this problem.  Symptoms can include pain, fever, itching, redness, drainage, or swelling of the ear canal. Temporary hearing loss may also occur.  Home care  · Do not try to clean the ear canal. This can push pus and bacteria deeper into the canal.  · Use prescribed ear drops as directed. These help reduce swelling and fight the infection. If an ear wick was placed in the ear canal, apply drops right onto the end of the wick. The wick will draw the medication into the ear canal even if it is swollen closed.  · A cotton ball may be loosely placed in the outer ear to absorb any drainage.  · You may use acetaminophen or ibuprofen to control pain, unless another medication was prescribed. Note: If you have chronic liver or kidney disease or ever had a stomach ulcer or GI bleeding, talk to your health care provider before taking any of these medications.  · Do not allow water to get into your ear when bathing. Also, avoid swimming until the infection has cleared.  Prevention  · Keep your ears dry.  This helps lower the risk of infection. Dry your ears with a towel or hair dryer after getting wet. Also, use ear plugs when swimming.  · Do not stick any objects in the ear to remove wax.  · If you feel water trapped in your ear, use ear drops right away. You can get these drops over the counter at most drugstores. They work by removing water from the ear canal.  Follow-up care  Follow up with your health care provider in one week, or as advised.  When to seek medical advice  Call your health care provider right away if any of these occur:  · Ear pain becomes worse or doesnt improve after 3 days of treatment  · Redness or swelling of the outer ear occurs or gets worse  · Headache  · Painful or stiff neck  · Drowsiness or confusion  · Fever of 100.4ºF (38ºC) or higher, or as directed by your health care provider  · Seizure  Date Last Reviewed: 3/22/2015  © 6896-1147 [employer]. [address] [place] [address], [place] [address]. All rights reserved. This information is not intended as a substitute for professional medical care. Always follow your healthcare professional's instructions.

## 2020-01-04 ENCOUNTER — OFFICE VISIT (OUTPATIENT)
Dept: URGENT CARE | Facility: CLINIC | Age: 85
End: 2020-01-04
Payer: MEDICARE

## 2020-01-04 VITALS
WEIGHT: 133 LBS | HEIGHT: 60 IN | TEMPERATURE: 97 F | OXYGEN SATURATION: 96 % | BODY MASS INDEX: 26.11 KG/M2 | HEART RATE: 55 BPM | DIASTOLIC BLOOD PRESSURE: 66 MMHG | RESPIRATION RATE: 18 BRPM | SYSTOLIC BLOOD PRESSURE: 158 MMHG

## 2020-01-04 DIAGNOSIS — R05.9 COUGH: ICD-10-CM

## 2020-01-04 DIAGNOSIS — R09.82 POST-NASAL DRAINAGE: Primary | ICD-10-CM

## 2020-01-04 PROCEDURE — 99214 PR OFFICE/OUTPT VISIT, EST, LEVL IV, 30-39 MIN: ICD-10-PCS | Mod: S$GLB,,, | Performed by: NURSE PRACTITIONER

## 2020-01-04 PROCEDURE — 99214 OFFICE O/P EST MOD 30 MIN: CPT | Mod: S$GLB,,, | Performed by: NURSE PRACTITIONER

## 2020-01-04 NOTE — PATIENT INSTRUCTIONS
"Cough   If your condition worsens or fails to improve we recommend that you receive another evaluation at the ER immediately or contact your PCP to discuss your concerns or return here. You must understand that you've received an urgent care treatment only and that you may be released before all your medical problems are known or treated. You the patient will arrange for follouwp care as instructed. .  Rest and fluids are important  Can use honey with brayan to soothe your throat  Take prescription cough meds (pills) as prescribed; take prescription cough syrup at night as needed for cough.  Do not take both the prescribed cough pills and syrup at the same time.   -  Flonase (fluticasone) is a nasal spray which is available over the counter and may help with your symptoms.   -  If you have hypertension avoid using the "D" which is the decongestant.  Instead you can use Coricidin HBP for cold and cough symptoms.    -  If you just have drainage you can take plain Zyrtec, Claritin or Allegra   -  Tylenol or ibuprofen can also be used as directed for pain unless you have an allergy to them or medical condition such as stomach ulcers, kidney or liver disease or blood thinners etc for which you should not be taking these type of medications.   Please follow up with your primary care doctor or specialist in the next 48-72hrs as needed and if no improvement  If you  smoke, please stop smoking.  "

## 2020-01-04 NOTE — PROGRESS NOTES
Subjective:       Patient ID: Bhumika Pitt is a 89 y.o. female.    Vitals:  height is 5' (1.524 m) and weight is 60.3 kg (133 lb). Her oral temperature is 97 °F (36.1 °C). Her blood pressure is 158/66 (abnormal) and her pulse is 55 (abnormal). Her respiration is 18 and oxygen saturation is 96%.     Chief Complaint: URI    URI    This is a new problem. The current episode started in the past 7 days (3 days). The problem has been unchanged. There has been no fever. Associated symptoms include congestion, coughing and a sore throat. Pertinent negatives include no ear pain, nausea, rash, sinus pain, vomiting or wheezing. She has tried decongestant for the symptoms. The treatment provided mild relief.       Constitution: Negative for chills, sweating, fatigue and fever.   HENT: Positive for congestion, postnasal drip and sore throat. Negative for ear pain, sinus pain, sinus pressure and voice change.    Neck: Negative for painful lymph nodes.   Eyes: Negative for eye redness.   Respiratory: Positive for cough. Negative for chest tightness, bloody sputum, COPD, shortness of breath, stridor, wheezing and asthma.    Gastrointestinal: Negative for nausea and vomiting.   Musculoskeletal: Negative for muscle ache.   Skin: Negative for rash.   Allergic/Immunologic: Negative for seasonal allergies and asthma.   Hematologic/Lymphatic: Negative for swollen lymph nodes.       Objective:      Physical Exam   Constitutional: She is oriented to person, place, and time. She appears well-developed and well-nourished.  Non-toxic appearance. She does not have a sickly appearance. She does not appear ill. No distress.   HENT:   Head: Normocephalic.   Right Ear: Hearing, tympanic membrane, external ear and ear canal normal.   Left Ear: Hearing, tympanic membrane, external ear and ear canal normal.   Nose: Nose normal.   Mouth/Throat: Uvula is midline, oropharynx is clear and moist and mucous membranes are normal.   Post nasal drainage  "  Cardiovascular: Normal rate and regular rhythm.   Pulmonary/Chest: Effort normal. No accessory muscle usage or stridor. No apnea, no tachypnea and no bradypnea. No respiratory distress. She has no decreased breath sounds. She has no wheezes. She has no rhonchi. She has no rales.   Neurological: She is alert and oriented to person, place, and time.   Nursing note and vitals reviewed.        Assessment:       1. Post-nasal drainage    2. Cough        Plan:         Post-nasal drainage    Cough         Patient Instructions   Cough   If your condition worsens or fails to improve we recommend that you receive another evaluation at the ER immediately or contact your PCP to discuss your concerns or return here. You must understand that you've received an urgent care treatment only and that you may be released before all your medical problems are known or treated. You the patient will arrange for follouwp care as instructed. .  Rest and fluids are important  Can use honey with brayan to soothe your throat  Take prescription cough meds (pills) as prescribed; take prescription cough syrup at night as needed for cough.  Do not take both the prescribed cough pills and syrup at the same time.   -  Flonase (fluticasone) is a nasal spray which is available over the counter and may help with your symptoms.   -  If you have hypertension avoid using the "D" which is the decongestant.  Instead you can use Coricidin HBP for cold and cough symptoms.    -  If you just have drainage you can take plain Zyrtec, Claritin or Allegra   -  Tylenol or ibuprofen can also be used as directed for pain unless you have an allergy to them or medical condition such as stomach ulcers, kidney or liver disease or blood thinners etc for which you should not be taking these type of medications.   Please follow up with your primary care doctor or specialist in the next 48-72hrs as needed and if no improvement  If you  smoke, please stop smoking.    "

## 2020-02-01 ENCOUNTER — OFFICE VISIT (OUTPATIENT)
Dept: URGENT CARE | Facility: CLINIC | Age: 85
End: 2020-02-01
Payer: MEDICARE

## 2020-02-01 VITALS
BODY MASS INDEX: 26.11 KG/M2 | DIASTOLIC BLOOD PRESSURE: 63 MMHG | HEIGHT: 60 IN | RESPIRATION RATE: 18 BRPM | TEMPERATURE: 98 F | WEIGHT: 133 LBS | HEART RATE: 68 BPM | SYSTOLIC BLOOD PRESSURE: 141 MMHG | OXYGEN SATURATION: 95 %

## 2020-02-01 DIAGNOSIS — R30.0 DYSURIA: ICD-10-CM

## 2020-02-01 DIAGNOSIS — N30.00 ACUTE CYSTITIS WITHOUT HEMATURIA: Primary | ICD-10-CM

## 2020-02-01 LAB
BILIRUB UR QL STRIP: POSITIVE
GLUCOSE UR QL STRIP: NEGATIVE
KETONES UR QL STRIP: NEGATIVE
LEUKOCYTE ESTERASE UR QL STRIP: NEGATIVE
PH, POC UA: 6.5 (ref 5–8)
POC BLOOD, URINE: NEGATIVE
POC NITRATES, URINE: NEGATIVE
PROT UR QL STRIP: NEGATIVE
SP GR UR STRIP: 1.01 (ref 1–1.03)
UROBILINOGEN UR STRIP-ACNC: NORMAL (ref 0.1–1.1)

## 2020-02-01 PROCEDURE — 99214 OFFICE O/P EST MOD 30 MIN: CPT | Mod: 25,S$GLB,, | Performed by: NURSE PRACTITIONER

## 2020-02-01 PROCEDURE — 99214 PR OFFICE/OUTPT VISIT, EST, LEVL IV, 30-39 MIN: ICD-10-PCS | Mod: 25,S$GLB,, | Performed by: NURSE PRACTITIONER

## 2020-02-01 PROCEDURE — 81003 URINALYSIS AUTO W/O SCOPE: CPT | Mod: QW,S$GLB,, | Performed by: NURSE PRACTITIONER

## 2020-02-01 PROCEDURE — 81003 POCT URINALYSIS, DIPSTICK, MANUAL, W/O SCOPE: ICD-10-PCS | Mod: QW,S$GLB,, | Performed by: NURSE PRACTITIONER

## 2020-02-01 PROCEDURE — 87086 URINE CULTURE/COLONY COUNT: CPT

## 2020-02-01 RX ORDER — NITROFURANTOIN 25; 75 MG/1; MG/1
100 CAPSULE ORAL 2 TIMES DAILY
Qty: 10 CAPSULE | Refills: 0 | Status: SHIPPED | OUTPATIENT
Start: 2020-02-01 | End: 2020-02-06

## 2020-02-01 RX ORDER — CIPROFLOXACIN 500 MG/1
500 TABLET ORAL 2 TIMES DAILY
Qty: 14 TABLET | Refills: 0 | Status: SHIPPED | OUTPATIENT
Start: 2020-02-01 | End: 2020-02-01 | Stop reason: CLARIF

## 2020-02-01 NOTE — PROGRESS NOTES
Subjective:       Patient ID: Bhumika Pitt is a 89 y.o. female.    Vitals:  height is 5' (1.524 m) and weight is 60.3 kg (133 lb). Her oral temperature is 97.5 °F (36.4 °C). Her blood pressure is 141/63 (abnormal) and her pulse is 68. Her respiration is 18 and oxygen saturation is 95%.     Chief Complaint: Urinary Tract Infection    Pt here today for c/o urinary frequency, urgency, and dysuria worse at night x3 days. Pt states she had bladder cancer about 60 years ago and gets routine cystoscopes, last in August 2019. Pt states symptoms today are similar to those of her UTI's, and Macrobid typically treats them. Denies fever, CVA tenderness, hematuria, rash, chills, or N/V/D. She tried taking Azo for the symptoms today.    Urinary Tract Infection    This is a new problem. The current episode started in the past 7 days (3 days). The problem occurs every urination. The problem has been unchanged. The quality of the pain is described as aching. The pain is at a severity of 3/10. The pain is mild. She is not sexually active. There is no history of pyelonephritis. Associated symptoms include frequency and urgency. Pertinent negatives include no chills, hematuria, nausea, vomiting or rash. Treatments tried: azo. The treatment provided mild relief.       Constitution: Negative for chills and fever.   Neck: Negative for painful lymph nodes.   Gastrointestinal: Negative for abdominal pain, nausea and vomiting.   Genitourinary: Positive for dysuria, frequency and urgency. Negative for urine decreased, hematuria, history of kidney stones, painful menstruation, irregular menstruation, missed menses, heavy menstrual bleeding, ovarian cysts, genital trauma, vaginal pain, vaginal discharge, vaginal bleeding, vaginal odor, painful intercourse, genital sore, painful ejaculation and pelvic pain.   Musculoskeletal: Negative for back pain.   Skin: Negative for rash and lesion.   Hematologic/Lymphatic: Negative for swollen lymph nodes.        Objective:      Physical Exam   Constitutional: She is oriented to person, place, and time. She appears well-developed and well-nourished.  Non-toxic appearance. She does not have a sickly appearance. She does not appear ill. No distress.   Pt calm and cooperative seen sitting pleasantly in chair in exam room. NAD noted.   HENT:   Head: Normocephalic and atraumatic.   Right Ear: External ear normal.   Left Ear: External ear normal.   Nose: Nose normal. No nasal deformity. No epistaxis.   Mouth/Throat: Oropharynx is clear and moist and mucous membranes are normal.   Eyes: Lids are normal.   Neck: Trachea normal, normal range of motion and phonation normal. Neck supple.   Cardiovascular: Normal pulses.   Pulmonary/Chest: Effort normal. No stridor. No respiratory distress.   Abdominal: Soft. Normal appearance and bowel sounds are normal. She exhibits no distension. There is no tenderness. There is no rigidity, no rebound, no guarding and no CVA tenderness.   Abdomen soft, flat, and non-distended.   Neurological: She is alert and oriented to person, place, and time.   Pt aaox3 and conversational.   Skin: Skin is warm, dry and intact.   Psychiatric: She has a normal mood and affect. Her speech is normal and behavior is normal. Cognition and memory are normal.   Nursing note and vitals reviewed.        Results for orders placed or performed in visit on 02/01/20   POCT Urinalysis, Dipstick, Manual, w/o Scope   Result Value Ref Range    POC Blood, Urine Negative Negative    POC Bilirubin, Urine Positive (A) Negative    POC Urobilinogen, Urine normal 0.1 - 1.1    POC Ketones, Urine Negative Negative    POC Protein, Urine Negative Negative    POC Nitrates, Urine Negative Negative    POC Glucose, Urine Negative Negative    pH, UA 6.5 5 - 8    POC Specific Gravity, Urine 1.015 1.003 - 1.029    POC Leukocytes, Urine Negative Negative     Assessment:       1. Acute cystitis without hematuria    2. Dysuria        Plan:          Acute cystitis without hematuria  -     Discontinue: ciprofloxacin HCl (CIPRO) 500 MG tablet; Take 1 tablet (500 mg total) by mouth 2 (two) times daily.  Dispense: 14 tablet; Refill: 0  -     nitrofurantoin, macrocrystal-monohydrate, (MACROBID) 100 MG capsule; Take 1 capsule (100 mg total) by mouth 2 (two) times daily. for 5 days  Dispense: 10 capsule; Refill: 0    Dysuria  -     POCT Urinalysis, Dipstick, Manual, w/o Scope  -     Urine culture    Urinalysis results discussed with pt as well as treatment plan today. Pt requesting Macrobid for treatment. Instructed to follow up with PCP or go to ER if symptoms fail to improve or worsen. Pt verbalizes understanding.     Patient Instructions       PLEASE READ YOUR DISCHARGE INSTRUCTIONS ENTIRELY AS IT CONTAINS IMPORTANT INFORMATION.      Take the antibiotics to completion.     Drink plenty of fluids, wipe front to back, take showers not baths, no scented soaps, wear breathable cotton underwear, urinate after sexual intercourse.     A urine culture was sent. You will be contacted once it results and appropriate action will be taken if needed.     Please go to the ER for worsening symptoms including fever, worsening flank pain, vomiting, etc.       Please return or see your primary care doctor if you develop new or worsening symptoms.     Please arrange follow up with your primary medical clinic as soon as possible. You must understand that you've received an Urgent Care treatment only and that you may be released before all of your medical problems are known or treated. You, the patient, will arrange for follow up as instructed. If your symptoms worsen or fail to improve you should go to the Emergency Room.  WE CANNOT RULE OUT ALL POSSIBLE CAUSES OF YOUR SYMPTOMS IN THE URGENT CARE SETTING PLEASE GO TO THE ER IF YOU FEELS YOUR CONDITION IS WORSENING OR YOU WOULD LIKE EMERGENT EVALUATION.        Bladder Infection, Female (Adult)    Urine is normally doesn't have any  "bacteria in it. But bacteria can get into the urinary tract from the skin around the rectum. Or they can travel in the blood from elsewhere in the body. Once they are in your urinary tract, they can cause infection in the urethra (urethritis), the bladder (cystitis), or the kidneys (pyelonephritis).  The most common place for an infection is in the bladder. This is called a bladder infection. This is one of the most common infections in women. Most bladder infections are easily treated. They are not serious unless the infection spreads to the kidney.  The phrases "bladder infection," "UTI," and "cystitis" are often used to describe the same thing. But they are not always the same. Cystitis is an inflammation of the bladder. The most common cause of cystitis is an infection.  Symptoms  The infection causes inflammation in the urethra and bladder. This causes many of the symptoms. The most common symptoms of a bladder infection are:  · Pain or burning when urinating  · Having to urinate more often than usual  · Urgent need to urinate  · Only a small amount of urine comes out  · Blood in urine  · Abdominal discomfort. This is usually in the lower abdomen above the pubic bone.  · Cloudy urine  · Strong- or bad-smelling urine  · Unable to urinate (urinary retention)  · Unable to hold urine in (urinary incontinence)  · Fever  · Loss of appetite  · Confusion (in older adults)  Causes  Bladder infections are not contagious. You can't get one from someone else, from a toilet seat, or from sharing a bath.  The most common cause of bladder infections is bacteria from the bowels. The bacteria get onto the skin around the opening of the urethra. From there, they can get into the urine and travel up to the bladder, causing inflammation and infection. This usually happens because of:  · Wiping improperly after urinating. Always wipe from front to back.  · Bowel incontinence  · Pregnancy  · Procedures such as having a catheter " inserted  · Older age  · Not emptying your bladder. This can allow bacteria a chance to grow in your urine.  · Dehydration  · Constipation  · Sex  · Use of a diaphragm for birth control   Treatment  Bladder infections are diagnosed by a urine test. They are treated with antibiotics and usually clear up quickly without complications. Treatment helps prevent a more serious kidney infection.  Medicines  Medicines can help in the treatment of a bladder infection:  · Take antibiotics until they are used up, even if you feel better. It is important to finish them to make sure the infection has cleared.  · You can use acetaminophen or ibuprofen for pain, fever, or discomfort, unless another medicine was prescribed. If you have chronic liver or kidney disease, talk with your healthcare provider before using these medicines. Also talk with your provider if you've ever had a stomach ulcer or gastrointestinal bleeding, or are taking blood-thinner medicines.  · If you are given phenazopydridine to reduce burning with urination, it will cause your urine to become a bright orange color. This can stain clothing.  Care and prevention  These self-care steps can help prevent future infections:  · Drink plenty of fluids to prevent dehydration and flush out your bladder. Do this unless you must restrict fluids for other health reasons, or your doctor told you not to.  · Proper cleaning after going to the bathroom is important. Wipe from front to back after using the toilet to prevent the spread of bacteria.  · Urinate more often. Don't try to hold urine in for a long time.  · Wear loose-fitting clothes and cotton underwear. Avoid tight-fitting pants.  · Improve your diet and prevent constipation. Eat more fresh fruit and vegetables, and fiber, and less junk and fatty foods.  · Avoid sex until your symptoms are gone.  · Avoid caffeine, alcohol, and spicy foods. These can irritate your bladder.  · Urinate right after intercourse to flush  out your bladder.  · If you use birth control pills and have frequent bladder infections, discuss it with your doctor.  Follow-up care  Call your healthcare provider if all symptoms are not gone after 3 days of treatment. This is especially important if you have repeat infections.  If a culture was done, you will be told if your treatment needs to be changed. If directed, you can call to find out the results.  If X-rays were done, you will be told if the results will affect your treatment.  Call 911  Call 911 if any of the following occur:  · Trouble breathing  · Hard to wake up or confusion  · Fainting or loss of consciousness  · Rapid heart rate  When to seek medical advice  Call your healthcare provider right away if any of these occur:  · Fever of 100.4ºF (38.0ºC) or higher, or as directed by your healthcare provider  · Symptoms are not better by the third day of treatment  · Back or belly (abdominal) pain that gets worse  · Repeated vomiting, or unable to keep medicine down  · Weakness or dizziness  · Vaginal discharge  · Pain, redness, or swelling in the outer vaginal area (labia)  Date Last Reviewed: 10/1/2016  © 3583-1970 [email or web address]. [address] [place] [address], [place] [address]. All rights reserved. This information is not intended as a substitute for professional medical care. Always follow your healthcare professional's instructions.

## 2020-02-01 NOTE — PATIENT INSTRUCTIONS
"    PLEASE READ YOUR DISCHARGE INSTRUCTIONS ENTIRELY AS IT CONTAINS IMPORTANT INFORMATION.      Take the antibiotics to completion.     Drink plenty of fluids, wipe front to back, take showers not baths, no scented soaps, wear breathable cotton underwear, urinate after sexual intercourse.     A urine culture was sent. You will be contacted once it results and appropriate action will be taken if needed.     Please go to the ER for worsening symptoms including fever, worsening flank pain, vomiting, etc.       Please return or see your primary care doctor if you develop new or worsening symptoms.     Please arrange follow up with your primary medical clinic as soon as possible. You must understand that you've received an Urgent Care treatment only and that you may be released before all of your medical problems are known or treated. You, the patient, will arrange for follow up as instructed. If your symptoms worsen or fail to improve you should go to the Emergency Room.  WE CANNOT RULE OUT ALL POSSIBLE CAUSES OF YOUR SYMPTOMS IN THE URGENT CARE SETTING PLEASE GO TO THE ER IF YOU FEELS YOUR CONDITION IS WORSENING OR YOU WOULD LIKE EMERGENT EVALUATION.        Bladder Infection, Female (Adult)    Urine is normally doesn't have any bacteria in it. But bacteria can get into the urinary tract from the skin around the rectum. Or they can travel in the blood from elsewhere in the body. Once they are in your urinary tract, they can cause infection in the urethra (urethritis), the bladder (cystitis), or the kidneys (pyelonephritis).  The most common place for an infection is in the bladder. This is called a bladder infection. This is one of the most common infections in women. Most bladder infections are easily treated. They are not serious unless the infection spreads to the kidney.  The phrases "bladder infection," "UTI," and "cystitis" are often used to describe the same thing. But they are not always the same. Cystitis is an " inflammation of the bladder. The most common cause of cystitis is an infection.  Symptoms  The infection causes inflammation in the urethra and bladder. This causes many of the symptoms. The most common symptoms of a bladder infection are:  · Pain or burning when urinating  · Having to urinate more often than usual  · Urgent need to urinate  · Only a small amount of urine comes out  · Blood in urine  · Abdominal discomfort. This is usually in the lower abdomen above the pubic bone.  · Cloudy urine  · Strong- or bad-smelling urine  · Unable to urinate (urinary retention)  · Unable to hold urine in (urinary incontinence)  · Fever  · Loss of appetite  · Confusion (in older adults)  Causes  Bladder infections are not contagious. You can't get one from someone else, from a toilet seat, or from sharing a bath.  The most common cause of bladder infections is bacteria from the bowels. The bacteria get onto the skin around the opening of the urethra. From there, they can get into the urine and travel up to the bladder, causing inflammation and infection. This usually happens because of:  · Wiping improperly after urinating. Always wipe from front to back.  · Bowel incontinence  · Pregnancy  · Procedures such as having a catheter inserted  · Older age  · Not emptying your bladder. This can allow bacteria a chance to grow in your urine.  · Dehydration  · Constipation  · Sex  · Use of a diaphragm for birth control   Treatment  Bladder infections are diagnosed by a urine test. They are treated with antibiotics and usually clear up quickly without complications. Treatment helps prevent a more serious kidney infection.  Medicines  Medicines can help in the treatment of a bladder infection:  · Take antibiotics until they are used up, even if you feel better. It is important to finish them to make sure the infection has cleared.  · You can use acetaminophen or ibuprofen for pain, fever, or discomfort, unless another medicine was  prescribed. If you have chronic liver or kidney disease, talk with your healthcare provider before using these medicines. Also talk with your provider if you've ever had a stomach ulcer or gastrointestinal bleeding, or are taking blood-thinner medicines.  · If you are given phenazopydridine to reduce burning with urination, it will cause your urine to become a bright orange color. This can stain clothing.  Care and prevention  These self-care steps can help prevent future infections:  · Drink plenty of fluids to prevent dehydration and flush out your bladder. Do this unless you must restrict fluids for other health reasons, or your doctor told you not to.  · Proper cleaning after going to the bathroom is important. Wipe from front to back after using the toilet to prevent the spread of bacteria.  · Urinate more often. Don't try to hold urine in for a long time.  · Wear loose-fitting clothes and cotton underwear. Avoid tight-fitting pants.  · Improve your diet and prevent constipation. Eat more fresh fruit and vegetables, and fiber, and less junk and fatty foods.  · Avoid sex until your symptoms are gone.  · Avoid caffeine, alcohol, and spicy foods. These can irritate your bladder.  · Urinate right after intercourse to flush out your bladder.  · If you use birth control pills and have frequent bladder infections, discuss it with your doctor.  Follow-up care  Call your healthcare provider if all symptoms are not gone after 3 days of treatment. This is especially important if you have repeat infections.  If a culture was done, you will be told if your treatment needs to be changed. If directed, you can call to find out the results.  If X-rays were done, you will be told if the results will affect your treatment.  Call 911  Call 911 if any of the following occur:  · Trouble breathing  · Hard to wake up or confusion  · Fainting or loss of consciousness  · Rapid heart rate  When to seek medical advice  Call your  healthcare provider right away if any of these occur:  · Fever of 100.4ºF (38.0ºC) or higher, or as directed by your healthcare provider  · Symptoms are not better by the third day of treatment  · Back or belly (abdominal) pain that gets worse  · Repeated vomiting, or unable to keep medicine down  · Weakness or dizziness  · Vaginal discharge  · Pain, redness, or swelling in the outer vaginal area (labia)  Date Last Reviewed: 10/1/2016  © 4833-9888 [employer]. [address] [place] [id number]. All rights reserved. This information is not intended as a substitute for professional medical care. Always follow your healthcare professional's instructions.

## 2020-02-02 ENCOUNTER — TELEPHONE (OUTPATIENT)
Dept: URGENT CARE | Facility: CLINIC | Age: 85
End: 2020-02-02

## 2020-02-02 LAB — BACTERIA UR CULT: NO GROWTH

## 2020-02-03 NOTE — TELEPHONE ENCOUNTER
Called to discuss urine culture results (no growth). Pt still taking Macrobid and symptoms improved. Pt prefers to take antibiotic to completion. All questions and concerns answered to pt's satisfaction.

## 2020-07-20 ENCOUNTER — OFFICE VISIT (OUTPATIENT)
Dept: URGENT CARE | Facility: CLINIC | Age: 85
End: 2020-07-20
Payer: MEDICARE

## 2020-07-20 VITALS
WEIGHT: 133 LBS | RESPIRATION RATE: 14 BRPM | OXYGEN SATURATION: 96 % | SYSTOLIC BLOOD PRESSURE: 151 MMHG | HEART RATE: 68 BPM | TEMPERATURE: 99 F | BODY MASS INDEX: 26.11 KG/M2 | DIASTOLIC BLOOD PRESSURE: 68 MMHG | HEIGHT: 60 IN

## 2020-07-20 DIAGNOSIS — R05.9 COUGH: ICD-10-CM

## 2020-07-20 DIAGNOSIS — U07.1 COVID-19: ICD-10-CM

## 2020-07-20 DIAGNOSIS — J06.9 UPPER RESPIRATORY TRACT INFECTION, UNSPECIFIED TYPE: Primary | ICD-10-CM

## 2020-07-20 PROCEDURE — 99213 PR OFFICE/OUTPT VISIT, EST, LEVL III, 20-29 MIN: ICD-10-PCS | Mod: S$GLB,,, | Performed by: FAMILY MEDICINE

## 2020-07-20 PROCEDURE — 99213 OFFICE O/P EST LOW 20 MIN: CPT | Mod: S$GLB,,, | Performed by: FAMILY MEDICINE

## 2020-07-20 PROCEDURE — U0003 INFECTIOUS AGENT DETECTION BY NUCLEIC ACID (DNA OR RNA); SEVERE ACUTE RESPIRATORY SYNDROME CORONAVIRUS 2 (SARS-COV-2) (CORONAVIRUS DISEASE [COVID-19]), AMPLIFIED PROBE TECHNIQUE, MAKING USE OF HIGH THROUGHPUT TECHNOLOGIES AS DESCRIBED BY CMS-2020-01-R: HCPCS

## 2020-07-20 NOTE — PATIENT INSTRUCTIONS

## 2020-07-20 NOTE — PROGRESS NOTES
Subjective:       Patient ID: Bhumika Pitt is a 89 y.o. female.    Vitals:  height is 5' (1.524 m) and weight is 60.3 kg (133 lb). Her temperature is 98.5 °F (36.9 °C). Her blood pressure is 151/68 (abnormal) and her pulse is 68. Her respiration is 14 and oxygen saturation is 96%.     Chief Complaint: Cough    Pt c/o postnasal drip, cough X 3 days    Cough  This is a new problem. Episode onset: 3 days. The problem has been waxing and waning. The cough is non-productive. Associated symptoms include postnasal drip. Pertinent negatives include no chest pain, chills, ear congestion, ear pain, fever, headaches, heartburn, hemoptysis, myalgias, rash, rhinorrhea, sore throat, shortness of breath, sweats, weight loss or wheezing. Treatments tried: flonase, xyzal. The treatment provided mild relief.       Constitution: Negative for chills, fatigue and fever.   HENT: Positive for postnasal drip. Negative for ear pain, congestion and sore throat.    Neck: Negative for painful lymph nodes.   Cardiovascular: Negative for chest pain and leg swelling.   Eyes: Negative for double vision and blurred vision.   Respiratory: Positive for cough. Negative for bloody sputum, shortness of breath and wheezing.    Gastrointestinal: Negative for nausea, vomiting, diarrhea and heartburn.   Genitourinary: Negative for dysuria, frequency, urgency and history of kidney stones.   Musculoskeletal: Negative for joint pain, joint swelling, muscle cramps and muscle ache.   Skin: Negative for color change, pale, rash and bruising.   Allergic/Immunologic: Negative for seasonal allergies.   Neurological: Negative for dizziness, history of vertigo, light-headedness, passing out and headaches.   Hematologic/Lymphatic: Negative for swollen lymph nodes.   Psychiatric/Behavioral: Negative for nervous/anxious, sleep disturbance and depression. The patient is not nervous/anxious.        Objective:      Physical Exam   Constitutional: She is oriented to person,  place, and time. She appears well-developed. She is cooperative.  Non-toxic appearance. She does not appear ill. No distress.   HENT:   Head: Normocephalic and atraumatic.   Ears:   Right Ear: Hearing, tympanic membrane, external ear and ear canal normal.   Left Ear: Hearing, tympanic membrane, external ear and ear canal normal.   Nose: Nose normal. No mucosal edema, rhinorrhea or nasal deformity. No epistaxis. Right sinus exhibits no maxillary sinus tenderness and no frontal sinus tenderness. Left sinus exhibits no maxillary sinus tenderness and no frontal sinus tenderness.   Mouth/Throat: Uvula is midline, oropharynx is clear and moist and mucous membranes are normal. No trismus in the jaw. Normal dentition. No uvula swelling. No oropharyngeal exudate, posterior oropharyngeal edema or posterior oropharyngeal erythema.   Eyes: Conjunctivae and lids are normal. No scleral icterus.   Neck: Trachea normal, full passive range of motion without pain and phonation normal. Neck supple. No neck rigidity. No edema and no erythema present.   Cardiovascular: Normal rate, regular rhythm, normal heart sounds and normal pulses.   Pulmonary/Chest: Effort normal and breath sounds normal. No respiratory distress. She has no decreased breath sounds. She has no rhonchi.   Abdominal: Normal appearance.   Musculoskeletal: Normal range of motion.         General: No deformity.   Neurological: She is alert and oriented to person, place, and time. She exhibits normal muscle tone. Coordination normal.   Skin: Skin is warm, dry, intact, not diaphoretic and not pale. Psychiatric: Her speech is normal and behavior is normal. Judgment and thought content normal.   Nursing note and vitals reviewed.        Assessment:       1. Upper respiratory tract infection, unspecified type    2. COVID-19    3. Cough        Plan:         Upper respiratory tract infection, unspecified type    COVID-19    Cough  -     COVID-19 Routine Screening

## 2020-07-23 LAB — SARS-COV-2 RNA RESP QL NAA+PROBE: NOT DETECTED

## 2020-07-24 ENCOUNTER — TELEPHONE (OUTPATIENT)
Dept: URGENT CARE | Facility: CLINIC | Age: 85
End: 2020-07-24

## 2020-07-24 NOTE — TELEPHONE ENCOUNTER
----- Message from Justin Gilliam MD sent at 7/23/2020  1:52 PM CDT -----  Call and let know covid swab negative

## 2020-11-20 ENCOUNTER — CLINICAL SUPPORT (OUTPATIENT)
Dept: URGENT CARE | Facility: CLINIC | Age: 85
End: 2020-11-20
Payer: MEDICARE

## 2020-11-20 DIAGNOSIS — Z11.9 ENCOUNTER FOR SCREENING EXAMINATION FOR INFECTIOUS DISEASE: Primary | ICD-10-CM

## 2020-11-20 DIAGNOSIS — U07.1 COVID-19 VIRUS DETECTED: ICD-10-CM

## 2020-11-20 LAB
CTP QC/QA: YES
SARS-COV-2 RDRP RESP QL NAA+PROBE: POSITIVE

## 2020-11-20 PROCEDURE — U0002: ICD-10-PCS | Mod: QW,S$GLB,, | Performed by: STUDENT IN AN ORGANIZED HEALTH CARE EDUCATION/TRAINING PROGRAM

## 2020-11-20 PROCEDURE — U0002 COVID-19 LAB TEST NON-CDC: HCPCS | Mod: QW,S$GLB,, | Performed by: STUDENT IN AN ORGANIZED HEALTH CARE EDUCATION/TRAINING PROGRAM

## 2020-11-20 PROCEDURE — 99211 OFF/OP EST MAY X REQ PHY/QHP: CPT | Mod: S$GLB,,, | Performed by: STUDENT IN AN ORGANIZED HEALTH CARE EDUCATION/TRAINING PROGRAM

## 2020-11-20 PROCEDURE — 99211 PR OFFICE/OUTPT VISIT, EST, LEVL I: ICD-10-PCS | Mod: S$GLB,,, | Performed by: STUDENT IN AN ORGANIZED HEALTH CARE EDUCATION/TRAINING PROGRAM

## 2020-11-20 RX ORDER — BENZONATATE 100 MG/1
200 CAPSULE ORAL 3 TIMES DAILY PRN
Qty: 30 CAPSULE | Refills: 1 | Status: SHIPPED | OUTPATIENT
Start: 2020-11-20 | End: 2020-11-30

## 2021-01-05 RX ORDER — BENZONATATE 100 MG/1
200 CAPSULE ORAL 3 TIMES DAILY PRN
Qty: 30 CAPSULE | Refills: 1 | Status: SHIPPED | OUTPATIENT
Start: 2021-01-05 | End: 2021-01-15

## 2021-01-13 ENCOUNTER — IMMUNIZATION (OUTPATIENT)
Dept: PHARMACY | Facility: CLINIC | Age: 86
End: 2021-01-13
Payer: MEDICARE

## 2021-01-13 DIAGNOSIS — Z23 NEED FOR VACCINATION: ICD-10-CM

## 2021-02-10 ENCOUNTER — IMMUNIZATION (OUTPATIENT)
Dept: PHARMACY | Facility: CLINIC | Age: 86
End: 2021-02-10
Payer: MEDICARE

## 2021-02-10 DIAGNOSIS — Z23 NEED FOR VACCINATION: Primary | ICD-10-CM

## 2021-05-28 DIAGNOSIS — R05.9 COUGH: Primary | ICD-10-CM

## 2021-05-28 RX ORDER — BENZONATATE 100 MG/1
200 CAPSULE ORAL 3 TIMES DAILY PRN
Qty: 30 CAPSULE | Refills: 1 | Status: SHIPPED | OUTPATIENT
Start: 2021-05-28 | End: 2021-06-07

## 2021-08-11 ENCOUNTER — CLINICAL SUPPORT (OUTPATIENT)
Dept: URGENT CARE | Facility: CLINIC | Age: 86
End: 2021-08-11
Payer: MEDICARE

## 2021-08-11 DIAGNOSIS — Z11.52 ENCOUNTER FOR SCREENING FOR COVID-19: Primary | ICD-10-CM

## 2021-08-11 LAB
CTP QC/QA: YES
SARS-COV-2 RDRP RESP QL NAA+PROBE: NEGATIVE

## 2021-08-11 PROCEDURE — 99211 PR OFFICE/OUTPT VISIT, EST, LEVL I: ICD-10-PCS | Mod: S$GLB,,, | Performed by: NURSE PRACTITIONER

## 2021-08-11 PROCEDURE — U0002 COVID-19 LAB TEST NON-CDC: HCPCS | Mod: QW,CR,S$GLB, | Performed by: NURSE PRACTITIONER

## 2021-08-11 PROCEDURE — 99211 OFF/OP EST MAY X REQ PHY/QHP: CPT | Mod: S$GLB,,, | Performed by: NURSE PRACTITIONER

## 2021-08-11 PROCEDURE — U0002: ICD-10-PCS | Mod: QW,CR,S$GLB, | Performed by: NURSE PRACTITIONER

## 2021-10-26 ENCOUNTER — IMMUNIZATION (OUTPATIENT)
Dept: PHARMACY | Facility: CLINIC | Age: 86
End: 2021-10-26
Payer: MEDICARE

## 2021-10-26 DIAGNOSIS — Z23 NEED FOR VACCINATION: Primary | ICD-10-CM

## 2022-07-30 RX ORDER — BENZONATATE 100 MG/1
200 CAPSULE ORAL 3 TIMES DAILY PRN
Qty: 30 CAPSULE | Refills: 1 | Status: SHIPPED | OUTPATIENT
Start: 2022-07-30 | End: 2022-08-09

## 2022-11-25 ENCOUNTER — OFFICE VISIT (OUTPATIENT)
Dept: URGENT CARE | Facility: CLINIC | Age: 87
End: 2022-11-25
Payer: MEDICARE

## 2022-11-25 VITALS
TEMPERATURE: 98 F | HEART RATE: 68 BPM | RESPIRATION RATE: 14 BRPM | SYSTOLIC BLOOD PRESSURE: 179 MMHG | WEIGHT: 133 LBS | HEIGHT: 60 IN | BODY MASS INDEX: 26.11 KG/M2 | DIASTOLIC BLOOD PRESSURE: 74 MMHG | OXYGEN SATURATION: 97 %

## 2022-11-25 DIAGNOSIS — I48.0 PAROXYSMAL ATRIAL FIBRILLATION: ICD-10-CM

## 2022-11-25 DIAGNOSIS — C67.9 MALIGNANT NEOPLASM OF URINARY BLADDER, UNSPECIFIED SITE: ICD-10-CM

## 2022-11-25 DIAGNOSIS — R09.82 POST-NASAL DRIP: Primary | ICD-10-CM

## 2022-11-25 DIAGNOSIS — E78.5 HYPERLIPIDEMIA, UNSPECIFIED HYPERLIPIDEMIA TYPE: ICD-10-CM

## 2022-11-25 PROBLEM — G45.9 TRANSIENT ISCHEMIC ATTACK: Status: ACTIVE | Noted: 2022-04-28

## 2022-11-25 PROBLEM — E03.9 HYPOTHYROIDISM: Status: ACTIVE | Noted: 2022-04-28

## 2022-11-25 PROBLEM — M79.7 FIBROMYOSITIS: Status: ACTIVE | Noted: 2022-11-25

## 2022-11-25 LAB
CTP QC/QA: YES
CTP QC/QA: YES
POC MOLECULAR INFLUENZA A AGN: NEGATIVE
POC MOLECULAR INFLUENZA B AGN: NEGATIVE
SARS-COV-2 AG RESP QL IA.RAPID: NEGATIVE

## 2022-11-25 PROCEDURE — 87811 SARS-COV-2 COVID19 W/OPTIC: CPT | Mod: QW,CR,S$GLB, | Performed by: SURGERY

## 2022-11-25 PROCEDURE — 99214 OFFICE O/P EST MOD 30 MIN: CPT | Mod: S$GLB,,, | Performed by: SURGERY

## 2022-11-25 PROCEDURE — 87502 INFLUENZA DNA AMP PROBE: CPT | Mod: QW,S$GLB,, | Performed by: SURGERY

## 2022-11-25 PROCEDURE — 87502 POCT INFLUENZA A/B MOLECULAR: ICD-10-PCS | Mod: QW,S$GLB,, | Performed by: SURGERY

## 2022-11-25 PROCEDURE — 87811 SARS CORONAVIRUS 2 ANTIGEN POCT, MANUAL READ: ICD-10-PCS | Mod: QW,CR,S$GLB, | Performed by: SURGERY

## 2022-11-25 PROCEDURE — 99214 PR OFFICE/OUTPT VISIT, EST, LEVL IV, 30-39 MIN: ICD-10-PCS | Mod: S$GLB,,, | Performed by: SURGERY

## 2022-11-25 RX ORDER — ASPIRIN 81 MG/1
81 TABLET ORAL
COMMUNITY

## 2022-11-25 RX ORDER — DOXEPIN HYDROCHLORIDE 10 MG/1
10 CAPSULE ORAL NIGHTLY
COMMUNITY
Start: 2022-09-11

## 2022-11-25 RX ORDER — AZELASTINE 1 MG/ML
2 SPRAY, METERED NASAL 2 TIMES DAILY
Qty: 30 ML | Refills: 0 | Status: SHIPPED | OUTPATIENT
Start: 2022-11-25 | End: 2022-12-25

## 2022-11-25 NOTE — PROGRESS NOTES
Subjective:       Patient ID: Bhumika Pitt is a 89 y.o. female.    Vitals:  height is 5' (1.524 m) and weight is 60.3 kg (133 lb). Her oral temperature is 98.1 °F (36.7 °C). Her blood pressure is 179/74 (abnormal) and her pulse is 68. Her respiration is 14 and oxygen saturation is 97%.     Chief Complaint: Sinus Problem    Sinus Problem  The current episode started 1 to 4 weeks ago (2 weeks ago; post-nasal drip onset last night). The problem has been gradually worsening since onset. There has been no fever. Her pain is at a severity of 0/10. She is experiencing no pain. Associated symptoms include sinus pressure and sneezing. Pertinent negatives include no chills, congestion, coughing, diaphoresis, ear pain, headaches, hoarse voice, neck pain, shortness of breath, sore throat or swollen glands. (Post-nasal drip) Treatments tried: tylenol PRN. The treatment provided moderate relief.     Constitution: Negative for chills and sweating.   HENT:  Positive for sinus pressure. Negative for ear pain, congestion and sore throat.    Neck: Negative for neck pain.   Respiratory:  Negative for cough and shortness of breath.    Allergic/Immunologic: Positive for sneezing.   Neurological:  Negative for headaches.     Objective:      Physical Exam   Constitutional: She is oriented to person, place, and time. She appears well-developed. She is cooperative.  Non-toxic appearance. She does not appear ill. No distress.   HENT:   Head: Normocephalic and atraumatic.   Ears:   Right Ear: Hearing, tympanic membrane, external ear and ear canal normal.   Left Ear: Hearing, tympanic membrane, external ear and ear canal normal.   Nose: Mucosal edema and rhinorrhea present. No nasal deformity. No epistaxis. Right sinus exhibits no maxillary sinus tenderness and no frontal sinus tenderness. Left sinus exhibits no maxillary sinus tenderness and no frontal sinus tenderness.   Mouth/Throat: Uvula is midline, oropharynx is clear and moist and  mucous membranes are normal. No trismus in the jaw. Normal dentition. No uvula swelling. No oropharyngeal exudate, posterior oropharyngeal edema or posterior oropharyngeal erythema.   Eyes: Conjunctivae and lids are normal. No scleral icterus.   Neck: Trachea normal and phonation normal. Neck supple. No edema present. No erythema present. No neck rigidity present.   Cardiovascular: Normal rate, regular rhythm, normal heart sounds and normal pulses.   Pulmonary/Chest: Effort normal and breath sounds normal. No respiratory distress. She has no decreased breath sounds. She has no rhonchi.   Abdominal: Normal appearance.   Musculoskeletal: Normal range of motion.         General: No deformity. Normal range of motion.   Neurological: She is alert and oriented to person, place, and time. She exhibits normal muscle tone. Coordination normal.   Skin: Skin is warm, dry, intact, not diaphoretic and not pale.   Psychiatric: Her speech is normal and behavior is normal. Judgment and thought content normal.   Nursing note and vitals reviewed.      Assessment:       1. Post-nasal drip    2. Hyperlipidemia, unspecified hyperlipidemia type    3. Malignant neoplasm of urinary bladder, unspecified site    4. Paroxysmal atrial fibrillation          Plan:         Post-nasal drip  -     SARS Coronavirus 2 Antigen, POCT Manual Read  -     POCT Influenza A/B MOLECULAR    Hyperlipidemia, unspecified hyperlipidemia type    Malignant neoplasm of urinary bladder, unspecified site    Paroxysmal atrial fibrillation

## 2023-02-27 PROBLEM — G45.9 TRANSIENT ISCHEMIC ATTACK: Status: RESOLVED | Noted: 2022-04-28 | Resolved: 2023-02-27

## 2023-12-30 ENCOUNTER — OFFICE VISIT (OUTPATIENT)
Dept: URGENT CARE | Facility: CLINIC | Age: 88
End: 2023-12-30
Payer: MEDICARE

## 2023-12-30 VITALS
HEIGHT: 60 IN | DIASTOLIC BLOOD PRESSURE: 79 MMHG | WEIGHT: 133 LBS | RESPIRATION RATE: 18 BRPM | BODY MASS INDEX: 26.11 KG/M2 | SYSTOLIC BLOOD PRESSURE: 138 MMHG | OXYGEN SATURATION: 96 % | HEART RATE: 75 BPM | TEMPERATURE: 98 F

## 2023-12-30 DIAGNOSIS — R35.0 FREQUENCY OF URINATION: Primary | ICD-10-CM

## 2023-12-30 DIAGNOSIS — R31.9 HEMATURIA, UNSPECIFIED TYPE: ICD-10-CM

## 2023-12-30 PROBLEM — I48.91 ATRIAL FIBRILLATION WITH RVR: Status: ACTIVE | Noted: 2022-04-28

## 2023-12-30 PROBLEM — M19.90 ARTHRITIS: Status: ACTIVE | Noted: 2022-04-28

## 2023-12-30 PROBLEM — N32.81 OAB (OVERACTIVE BLADDER): Status: ACTIVE | Noted: 2023-10-23

## 2023-12-30 PROBLEM — E04.1 THYROID NODULE: Status: ACTIVE | Noted: 2022-04-28

## 2023-12-30 PROBLEM — H25.9 AGE-RELATED CATARACT: Status: ACTIVE | Noted: 2023-12-30

## 2023-12-30 PROBLEM — Z79.01 LONG TERM CURRENT USE OF ANTICOAGULANT: Status: ACTIVE | Noted: 2023-10-23

## 2023-12-30 PROBLEM — C67.9 MALIGNANT NEOPLASM OF URINARY BLADDER: Status: ACTIVE | Noted: 2022-04-28

## 2023-12-30 PROBLEM — M79.7 FIBROMYOSITIS: Status: ACTIVE | Noted: 2022-04-28

## 2023-12-30 LAB
BILIRUB UR QL STRIP: NEGATIVE
GLUCOSE UR QL STRIP: NEGATIVE
KETONES UR QL STRIP: NEGATIVE
LEUKOCYTE ESTERASE UR QL STRIP: NEGATIVE
PH, POC UA: 7 (ref 5–8)
POC BLOOD, URINE: POSITIVE
POC NITRATES, URINE: NEGATIVE
PROT UR QL STRIP: NEGATIVE
SP GR UR STRIP: 1.01 (ref 1–1.03)
UROBILINOGEN UR STRIP-ACNC: NORMAL (ref 0.1–1.1)

## 2023-12-30 PROCEDURE — 81003 POCT URINALYSIS, DIPSTICK, AUTOMATED, W/O SCOPE: ICD-10-PCS | Mod: QW,S$GLB,, | Performed by: PHYSICIAN ASSISTANT

## 2023-12-30 PROCEDURE — 87086 URINE CULTURE/COLONY COUNT: CPT | Performed by: PHYSICIAN ASSISTANT

## 2023-12-30 PROCEDURE — 99213 OFFICE O/P EST LOW 20 MIN: CPT | Mod: S$GLB,,, | Performed by: PHYSICIAN ASSISTANT

## 2023-12-30 PROCEDURE — 99213 PR OFFICE/OUTPT VISIT, EST, LEVL III, 20-29 MIN: ICD-10-PCS | Mod: S$GLB,,, | Performed by: PHYSICIAN ASSISTANT

## 2023-12-30 PROCEDURE — 81003 URINALYSIS AUTO W/O SCOPE: CPT | Mod: QW,S$GLB,, | Performed by: PHYSICIAN ASSISTANT

## 2023-12-30 RX ORDER — NITROFURANTOIN 25; 75 MG/1; MG/1
100 CAPSULE ORAL 2 TIMES DAILY
Qty: 10 CAPSULE | Refills: 0 | Status: SHIPPED | OUTPATIENT
Start: 2023-12-30 | End: 2024-01-04

## 2023-12-30 NOTE — PROGRESS NOTES
Subjective:      Patient ID: Bhumika Pitt is a 89 y.o. female.    Vitals:  height is 5' (1.524 m) and weight is 60.3 kg (133 lb). Her oral temperature is 98.2 °F (36.8 °C). Her blood pressure is 138/79 and her pulse is 75. Her respiration is 18 and oxygen saturation is 96%.     Chief Complaint: Urinary Tract Infection    This is a 89 y.o. female who presents today with a chief complaint of UTI. Patient started having having frequency last night. No other symptoms.      Urinary Tract Infection   This is a new problem. The current episode started yesterday. The pain is at a severity of 0/10. The patient is experiencing no pain. There has been no fever. She is Not sexually active. There is No history of pyelonephritis. Associated symptoms include frequency. Pertinent negatives include no chills, flank pain, hematuria, nausea, urgency, vomiting, constipation or rash. She has tried nothing for the symptoms. The treatment provided no relief.     Constitution: Negative for chills, fatigue and fever.   Cardiovascular:  Negative for chest pain.   Respiratory:  Negative for cough and shortness of breath.    Gastrointestinal:  Negative for abdominal pain, nausea, vomiting, constipation and diarrhea.   Genitourinary:  Positive for frequency. Negative for dysuria, urgency, flank pain, hematuria, irregular menstruation, vaginal pain, vaginal discharge, vaginal bleeding, vaginal odor, painful intercourse, genital sore and pelvic pain.   Musculoskeletal:  Negative for back pain, muscle cramps and muscle ache.   Skin:  Negative for rash.   Neurological:  Negative for altered mental status.   Psychiatric/Behavioral:  Negative for altered mental status.       Past Medical History:   Diagnosis Date    Hypothyroidism        Past Surgical History:   Procedure Laterality Date    BACK SURGERY      BLADDER SURGERY      CHOLECYSTECTOMY         History reviewed. No pertinent family history.    Social History     Socioeconomic History     Marital status:    Tobacco Use    Smoking status: Never    Smokeless tobacco: Never   Substance and Sexual Activity    Alcohol use: No    Drug use: No       Current Outpatient Medications   Medication Sig Dispense Refill    aspirin (ECOTRIN) 81 MG EC tablet Take 81 mg by mouth.      atenolol (TENORMIN) 25 MG tablet Take 25 mg by mouth once daily.      diltiaZEM (CARDIZEM CD) 120 MG Cp24 Take 1 capsule (120 mg total) by mouth once daily. 30 capsule 5    doxepin (SINEQUAN) 10 MG capsule Take 10 mg by mouth every evening.      doxepin (SINEQUAN) 10 mg/mL solution Take by mouth every evening.      levothyroxine (SYNTHROID) 25 MCG tablet Take 25 mcg by mouth once daily.      losartan (COZAAR) 100 MG tablet Take 100 mg by mouth once daily.      simvastatin (ZOCOR) 20 MG tablet Take 20 mg by mouth every evening.      acetaminophen (TYLENOL) 500 MG tablet Take 500 mg by mouth every 6 (six) hours as needed for Pain.      azelastine (ASTELIN) 137 mcg (0.1 %) nasal spray 2 sprays (274 mcg total) by Nasal route 2 (two) times daily. 30 mL 0    nitrofurantoin, macrocrystal-monohydrate, (MACROBID) 100 MG capsule Take 1 capsule (100 mg total) by mouth 2 (two) times daily. for 5 days 10 capsule 0     No current facility-administered medications for this visit.       Review of patient's allergies indicates:   Allergen Reactions    Codeine      Keeps pt awake at night    Methylene blue Other (See Comments)       Objective:     Physical Exam   Constitutional: She is oriented to person, place, and time. She appears well-developed.  Non-toxic appearance. She does not appear ill. No distress.   HENT:   Head: Normocephalic and atraumatic.   Ears:   Right Ear: External ear normal.   Left Ear: External ear normal.   Nose: Nose normal.   Mouth/Throat: Mucous membranes are normal.   Eyes: Conjunctivae and lids are normal.   Neck: Trachea normal.   Cardiovascular: Normal rate, regular rhythm and normal heart sounds.   No murmur  heard.Exam reveals no gallop and no friction rub.   Pulmonary/Chest: Effort normal and breath sounds normal. No stridor. No respiratory distress. She has no wheezes. She has no rhonchi. She has no rales.   Abdominal: Normal appearance and bowel sounds are normal. She exhibits no distension and no mass. Soft. flat abdomen There is no abdominal tenderness. There is no rebound, no guarding, no left CVA tenderness and no right CVA tenderness. No hernia.   Neurological: She is alert and oriented to person, place, and time. She has normal strength.   Skin: Skin is warm, dry, intact, not diaphoretic, not pale and no rash.   Psychiatric: Her speech is normal and behavior is normal. Mood, judgment and thought content normal.   Nursing note and vitals reviewed.    Results for orders placed or performed in visit on 12/30/23   POCT Urinalysis, Dipstick, Automated, W/O Scope   Result Value Ref Range    POC Blood, Urine Positive (A) Negative, Positive Slide, Positive Tube    POC Bilirubin, Urine Negative Negative, Positive Slide, Positive Tube    POC Urobilinogen, Urine normal 0.1 - 1.1    POC Ketones, Urine Negative Negative, Positive Slide, Positive Tube    POC Protein, Urine Negative Negative, Positive Slide, Positive Tube    POC Nitrates, Urine Negative Negative, Positive Slide, Positive Tube    POC Glucose, Urine Negative Negative, Positive Slide, Positive Tube    pH, UA 7.0 5 - 8    POC Specific Gravity, Urine 1.010 1.003 - 1.029    POC Leukocytes, Urine Negative Negative, Positive Slide, Positive Tube       Assessment:     1. Frequency of urination    2. Hematuria, unspecified type        Plan:       Frequency of urination  -     POCT Urinalysis, Dipstick, Automated, W/O Scope  -     CULTURE, URINE  -     nitrofurantoin, macrocrystal-monohydrate, (MACROBID) 100 MG capsule; Take 1 capsule (100 mg total) by mouth 2 (two) times daily. for 5 days  Dispense: 10 capsule; Refill: 0    Hematuria, unspecified type    Results  reviewed  I have reviewed the patient chart and pertinent past imaging/labs.  Patient Instructions   If your condition worsens or fails to improve we recommend that you receive another evaluation at the ER immediately or contact your PCP to discuss your concerns or return here. You must understand that you've received an urgent care treatment only and that you may be released before all your medical problems are known or treated. You the patient will arrange for followup care as instructed.   If you had cultures done it will take 3-5 days to result. We will call you with the result.     Cranberry juice may help. Get the 100% cranberry juice and mix 4 oz of juice with 4 oz of water and drink this 8 oz glass of liquid once a day.   Increase water intake to at least 8-10 glasses/day.    Avoid caffeine, alcohol, or spicy foods as they irritate the bladder.

## 2023-12-30 NOTE — PATIENT INSTRUCTIONS
If your condition worsens or fails to improve we recommend that you receive another evaluation at the ER immediately or contact your PCP to discuss your concerns or return here. You must understand that you've received an urgent care treatment only and that you may be released before all your medical problems are known or treated. You the patient will arrange for followup care as instructed.   If you had cultures done it will take 3-5 days to result. We will call you with the result.     Cranberry juice may help. Get the 100% cranberry juice and mix 4 oz of juice with 4 oz of water and drink this 8 oz glass of liquid once a day.   Increase water intake to at least 8-10 glasses/day.    Avoid caffeine, alcohol, or spicy foods as they irritate the bladder.

## 2024-01-01 ENCOUNTER — TELEPHONE (OUTPATIENT)
Dept: URGENT CARE | Facility: CLINIC | Age: 89
End: 2024-01-01
Payer: MEDICARE

## 2024-01-01 LAB
BACTERIA UR CULT: NORMAL
BACTERIA UR CULT: NORMAL

## 2024-01-01 NOTE — TELEPHONE ENCOUNTER
Spoke to patient's granddaughter.  Patient urine culture positive for multiple organisms.  None in predominance.  Grandmother states patient's symptoms has improved while taking Macrobid.  No other complaints at this time.  Patient instructed to finish course of antibiotics and if have further concerns to follow up with her OBGYN or Urology.  Granddaughter verbalized understanding

## 2024-04-01 PROBLEM — G45.9 TRANSIENT ISCHEMIC ATTACK: Status: RESOLVED | Noted: 2022-04-28 | Resolved: 2024-04-01

## 2024-07-13 ENCOUNTER — OFFICE VISIT (OUTPATIENT)
Dept: URGENT CARE | Facility: CLINIC | Age: 89
End: 2024-07-13
Payer: MEDICARE

## 2024-07-13 VITALS
HEIGHT: 60 IN | BODY MASS INDEX: 26.11 KG/M2 | HEART RATE: 74 BPM | WEIGHT: 133 LBS | DIASTOLIC BLOOD PRESSURE: 75 MMHG | RESPIRATION RATE: 16 BRPM | SYSTOLIC BLOOD PRESSURE: 150 MMHG | OXYGEN SATURATION: 95 % | TEMPERATURE: 98 F

## 2024-07-13 DIAGNOSIS — J02.9 SORE THROAT: ICD-10-CM

## 2024-07-13 DIAGNOSIS — R09.82 POSTNASAL DRIP: Primary | ICD-10-CM

## 2024-07-13 LAB
CTP QC/QA: YES
SARS-COV-2 AG RESP QL IA.RAPID: NEGATIVE

## 2024-07-13 PROCEDURE — 87811 SARS-COV-2 COVID19 W/OPTIC: CPT | Mod: QW,S$GLB,, | Performed by: FAMILY MEDICINE

## 2024-07-13 PROCEDURE — 99213 OFFICE O/P EST LOW 20 MIN: CPT | Mod: S$GLB,,, | Performed by: FAMILY MEDICINE

## 2024-07-13 RX ORDER — IPRATROPIUM BROMIDE 21 UG/1
2 SPRAY, METERED NASAL 2 TIMES DAILY PRN
Qty: 30 ML | Refills: 0 | Status: SHIPPED | OUTPATIENT
Start: 2024-07-13

## 2024-07-13 NOTE — PROGRESS NOTES
Subjective:      Patient ID: Bhumika Pitt is a 89 y.o. female.    Vitals:  height is 5' (1.524 m) and weight is 60.3 kg (133 lb).     Chief Complaint: Sore Throat    This is a 89 y.o. female who presents today with a chief complaint of post nasal drip onset 1 week. Pt does not have cough or sore throat but is complaining of constantly having to clear throat.           Cough  This is a new problem. The current episode started in the past 7 days. The problem has been unchanged. The problem occurs every few minutes. The cough is Non-productive. Associated symptoms include postnasal drip. Pertinent negatives include no ear congestion, fever, headaches, nasal congestion or sore throat. Nothing aggravates the symptoms. She has tried nothing for the symptoms. There is no history of asthma, bronchitis, environmental allergies or pneumonia.     Constitution: Negative for fever.   HENT:  Positive for postnasal drip. Negative for sore throat.    Skin:  Negative for erythema.   Allergic/Immunologic: Negative for environmental allergies.   Neurological:  Negative for headaches.    Objective:     Physical Exam   Constitutional: She is oriented to person, place, and time. She does not appear ill. No distress. normal  HENT:   Head: Normocephalic and atraumatic.   Ears:   Right Ear: Tympanic membrane, external ear and ear canal normal.   Left Ear: Tympanic membrane, external ear and ear canal normal.   Nose: Rhinorrhea and congestion present.   Mouth/Throat: Mucous membranes are moist. No oropharyngeal exudate or posterior oropharyngeal erythema. Oropharynx is clear.      Comments: Marked postnasal drip  Eyes: Conjunctivae are normal. Pupils are equal, round, and reactive to light. Extraocular movement intact   Neck: Neck supple.   Cardiovascular: Normal rate, regular rhythm, normal heart sounds and normal pulses.   Pulmonary/Chest: Effort normal and breath sounds normal. No respiratory distress.   Abdominal: Normal  appearance and bowel sounds are normal. Soft. flat abdomen   Musculoskeletal: Normal range of motion.         General: Normal range of motion.   Neurological: no focal deficit. She is alert, oriented to person, place, and time and at baseline.   Skin: Skin is warm and dry. Capillary refill takes less than 2 seconds. No erythema   Psychiatric: Her behavior is normal. Mood, judgment and thought content normal.   Nursing note and vitals reviewed.    Assessment:     Plan:   1. Postnasal drip  - SARS Coronavirus 2 Antigen, POCT Manual Read  - ipratropium (ATROVENT) 21 mcg (0.03 %) nasal spray; 2 sprays by Each Nostril route 2 (two) times daily as needed.  Dispense: 30 mL; Refill: 0   All results discussed with pt prior to discharge from clinic

## 2025-01-09 RX ORDER — BENZONATATE 100 MG/1
100 CAPSULE ORAL 3 TIMES DAILY PRN
Qty: 30 CAPSULE | Refills: 2 | Status: SHIPPED | OUTPATIENT
Start: 2025-01-09 | End: 2025-02-08

## 2025-01-10 ENCOUNTER — OFFICE VISIT (OUTPATIENT)
Dept: URGENT CARE | Facility: CLINIC | Age: OVER 89
End: 2025-01-10
Payer: MEDICARE

## 2025-01-10 VITALS
OXYGEN SATURATION: 96 % | HEART RATE: 78 BPM | BODY MASS INDEX: 26.1 KG/M2 | RESPIRATION RATE: 19 BRPM | HEIGHT: 60 IN | SYSTOLIC BLOOD PRESSURE: 129 MMHG | WEIGHT: 132.94 LBS | DIASTOLIC BLOOD PRESSURE: 76 MMHG | TEMPERATURE: 98 F

## 2025-01-10 DIAGNOSIS — R09.82 PND (POST-NASAL DRIP): ICD-10-CM

## 2025-01-10 DIAGNOSIS — J06.9 VIRAL URI WITH COUGH: Primary | ICD-10-CM

## 2025-01-10 DIAGNOSIS — R05.9 COUGH, UNSPECIFIED TYPE: ICD-10-CM

## 2025-01-10 DIAGNOSIS — J04.0 LARYNGITIS: ICD-10-CM

## 2025-01-10 RX ORDER — FLUTICASONE PROPIONATE 50 MCG
1 SPRAY, SUSPENSION (ML) NASAL 2 TIMES DAILY
Qty: 15.8 ML | Refills: 0 | Status: SHIPPED | OUTPATIENT
Start: 2025-01-10 | End: 2025-02-09

## 2025-01-10 RX ORDER — PREDNISONE 20 MG/1
20 TABLET ORAL DAILY
Qty: 4 TABLET | Refills: 0 | Status: SHIPPED | OUTPATIENT
Start: 2025-01-10 | End: 2025-01-14

## 2025-01-10 NOTE — PROGRESS NOTES
Subjective:      Patient ID: Bhumika Pitt is a 89 y.o. female.    Vitals:  height is 5' (1.524 m) and weight is 60.3 kg (132 lb 15 oz). Her oral temperature is 98 °F (36.7 °C). Her blood pressure is 129/76 and her pulse is 78. Her respiration is 19 and oxygen saturation is 96%.     Chief Complaint: Cough    Pt is a 89 yo female with Pmhx of HLD, a. Fib, OAB. She is presenting with sore throat, wheezing, nonproductive cough.  Onset of symptoms was 3 days. Denies any CP, SOB, ABD pain, N/V/D, chills.  Pt reports using OTC nasal spray with some relief. She also used Tessalon from ENT. States that she had a birthday party and unsure if anyone there was sick.     Cough  This is a new problem. The current episode started in the past 7 days. The problem has been gradually worsening. The problem occurs constantly. The cough is Non-productive. Associated symptoms include nasal congestion, postnasal drip and a sore throat. Pertinent negatives include no chest pain, chills, ear congestion, ear pain, eye redness, fever, headaches, heartburn, hemoptysis, myalgias, rash, rhinorrhea, shortness of breath, sweats, weight loss or wheezing. Nothing aggravates the symptoms. Treatments tried: OTC nasal spray & Tessalon Pearls. The treatment provided mild relief. There is no history of asthma, bronchiectasis, bronchitis, COPD, emphysema, environmental allergies or pneumonia.       Constitution: Negative for activity change, appetite change, chills and fever.   HENT:  Positive for postnasal drip and sore throat. Negative for ear pain, congestion, sinus pain and sinus pressure.    Neck: Negative for neck pain.   Cardiovascular:  Negative for chest pain and sob on exertion.   Eyes:  Negative for eye trauma, eye discharge, eye itching, eye redness, photophobia and blurred vision.   Respiratory:  Positive for cough. Negative for bloody sputum, shortness of breath, wheezing and asthma.    Gastrointestinal:  Negative for abdominal pain,  nausea, vomiting, constipation, diarrhea and heartburn.   Genitourinary:  Negative for dysuria, frequency, urgency, urine decreased and hematuria.   Musculoskeletal:  Negative for pain and muscle ache.   Skin:  Negative for color change, rash and hives.   Allergic/Immunologic: Negative for environmental allergies, seasonal allergies, asthma, hives and sneezing.   Neurological:  Negative for dizziness, light-headedness, headaches and altered mental status.   Psychiatric/Behavioral:  Negative for altered mental status and confusion.       Objective:     Physical Exam   Constitutional: She is oriented to person, place, and time. She appears well-developed. She is cooperative.  Non-toxic appearance. She does not appear ill. No distress.      Comments:Pt sitting erect on examination table. No acute respiratory distress, no use of accessory muscles, no notice of nasal flaring.        HENT:   Head: Normocephalic and atraumatic.   Ears:   Right Ear: Hearing and external ear normal.   Left Ear: Hearing and external ear normal.   Nose: Nose normal. No mucosal edema, rhinorrhea or nasal deformity. No epistaxis. Right sinus exhibits no maxillary sinus tenderness and no frontal sinus tenderness. Left sinus exhibits no maxillary sinus tenderness and no frontal sinus tenderness.   Mouth/Throat: Uvula is midline, oropharynx is clear and moist and mucous membranes are normal. No trismus in the jaw. Normal dentition. No uvula swelling. No oropharyngeal exudate, posterior oropharyngeal edema or posterior oropharyngeal erythema.   Eyes: Conjunctivae and lids are normal. No scleral icterus.   Neck: Trachea normal and phonation normal. Neck supple. No edema present. No erythema present. No neck rigidity present.   Cardiovascular: Normal rate, regular rhythm, normal heart sounds and normal pulses.   Pulmonary/Chest: Effort normal and breath sounds normal. No accessory muscle usage. No apnea, no tachypnea and no bradypnea. No respiratory  distress. She has no decreased breath sounds. She has no wheezes. She has no rhonchi.   Lungs clear to auscultation B/L           Comments: Lungs clear to auscultation B/L      Abdominal: Normal appearance.   Musculoskeletal: Normal range of motion.         General: No deformity. Normal range of motion.   Lymphadenopathy:     She has no cervical adenopathy.        Right cervical: No superficial cervical and no posterior cervical adenopathy present.       Left cervical: No superficial cervical and no posterior cervical adenopathy present.   Neurological: She is alert and oriented to person, place, and time. She exhibits normal muscle tone. Coordination normal.   Skin: Skin is warm, dry, intact, not diaphoretic and not pale.   Psychiatric: Her speech is normal and behavior is normal. Judgment and thought content normal.   Nursing note and vitals reviewed.    Results for orders placed or performed in visit on 01/10/25   SARS Coronavirus 2 Antigen, POCT Manual Read    Collection Time: 01/10/25  2:44 PM   Result Value Ref Range    SARS Coronavirus 2 Antigen Negative Negative     Acceptable Yes    POCT Influenza A/B MOLECULAR    Collection Time: 01/10/25  2:44 PM   Result Value Ref Range    POC Molecular Influenza A Ag Negative Negative    POC Molecular Influenza B Ag Negative Negative     Acceptable Yes        Assessment:     1. Viral URI with cough    2. Cough, unspecified type    3. PND (post-nasal drip)    4. Laryngitis        Plan:   I have reviewed the patient chart and pertinent past imaging/labs.      Viral URI with cough    Cough, unspecified type  -     SARS Coronavirus 2 Antigen, POCT Manual Read  -     POCT Influenza A/B MOLECULAR    PND (post-nasal drip)  -     fluticasone propionate (FLONASE) 50 mcg/actuation nasal spray; 1 spray (50 mcg total) by Each Nostril route 2 (two) times a day.  Dispense: 15.8 mL; Refill: 0    Laryngitis  -     predniSONE (DELTASONE) 20 MG tablet; Take  1 tablet (20 mg total) by mouth once daily. for 4 days  Dispense: 4 tablet; Refill: 0

## 2025-01-10 NOTE — PATIENT INSTRUCTIONS
Hoarseness: Prednisone once daily for 4 days   Fever/Pain: Alternate Tylenol and Ibuprofen as needed every 4-6 hours  Nasal congestion/Runny nose: Nasal spray twice daily as needed  Monitor for chest pain, shortness of breath, worsening of symptoms, or fever unresponsive to medication.   Please drink plenty of fluids.  Please get plenty of rest.  Please return here or go to the Emergency Department for any concerns or worsening of condition.  If you were prescribed antibiotics, please take them to completion.  If you were given wait & see antibiotics, please wait 5-7 days before taking them, and only take them if your symptoms have worsened or not improved.  If you do begin taking the antibiotics, please take them to completion.  If you were prescribed a narcotic medication, do not drive or operate heavy equipment or machinery while taking these medications.  If you were given a steroid shot in the clinic and have also been given a prescription for a steroid such as Prednisone or a Medrol Dose Pack, please begin taking them tomorrow.  If you do not have Hypertension or any history of palpitations, it is ok to take over the counter Sudafed or Mucinex D or Allegra-D or Claritin-D or Zyrtec-D.  If you do take one of the above, it is ok to combine that with plain over the counter Mucinex or Allegra or Claritin or Zyrtec.  If for example you are taking Zyrtec -D, you can combine that with Mucinex, but not Mucinex-D.  If you are taking Mucinex-D, you can combine that with plain Allegra or Claritin or Zyrtec.   If you do have Hypertension or palpitations, it is safe to take Coricidin HBP for relief of sinus symptoms.  If not allergic, please take over the counter Tylenol (Acetaminophen) and/or Motrin (Ibuprofen) as directed for control of pain and/or fever.  Please follow up with your primary care doctor or specialist as needed.    If you  smoke, please stop smoking.

## 2025-07-15 ENCOUNTER — OFFICE VISIT (OUTPATIENT)
Dept: URGENT CARE | Facility: CLINIC | Age: OVER 89
End: 2025-07-15
Payer: MEDICARE

## 2025-07-15 VITALS
SYSTOLIC BLOOD PRESSURE: 165 MMHG | DIASTOLIC BLOOD PRESSURE: 74 MMHG | WEIGHT: 132 LBS | TEMPERATURE: 98 F | RESPIRATION RATE: 18 BRPM | HEIGHT: 60 IN | BODY MASS INDEX: 25.91 KG/M2 | HEART RATE: 76 BPM | OXYGEN SATURATION: 94 %

## 2025-07-15 DIAGNOSIS — N32.81 OAB (OVERACTIVE BLADDER): ICD-10-CM

## 2025-07-15 DIAGNOSIS — R39.9 UTI SYMPTOMS: Primary | ICD-10-CM

## 2025-07-15 LAB
BILIRUBIN, UA POC OHS: NEGATIVE
BLOOD, UA POC OHS: ABNORMAL
CLARITY, UA POC OHS: CLEAR
COLOR, UA POC OHS: YELLOW
GLUCOSE, UA POC OHS: NEGATIVE
KETONES, UA POC OHS: NEGATIVE
LEUKOCYTES, UA POC OHS: NEGATIVE
NITRITE, UA POC OHS: NEGATIVE
PH, UA POC OHS: 5.5
PROTEIN, UA POC OHS: ABNORMAL
SPECIFIC GRAVITY, UA POC OHS: 1.02
UROBILINOGEN, UA POC OHS: 0.2

## 2025-07-15 PROCEDURE — 81003 URINALYSIS AUTO W/O SCOPE: CPT | Mod: QW,S$GLB,, | Performed by: PHYSICIAN ASSISTANT

## 2025-07-15 PROCEDURE — 99214 OFFICE O/P EST MOD 30 MIN: CPT | Mod: S$GLB,,, | Performed by: PHYSICIAN ASSISTANT

## 2025-07-15 PROCEDURE — 87086 URINE CULTURE/COLONY COUNT: CPT | Performed by: PHYSICIAN ASSISTANT

## 2025-07-15 RX ORDER — NITROFURANTOIN 25; 75 MG/1; MG/1
100 CAPSULE ORAL 2 TIMES DAILY
Qty: 14 CAPSULE | Refills: 0 | Status: SHIPPED | OUTPATIENT
Start: 2025-07-15 | End: 2025-07-22

## 2025-07-15 NOTE — PROGRESS NOTES
Subjective:      Patient ID: Bhumika Pitt is a 89 y.o. female.    Vitals:  height is 5' (1.524 m) and weight is 59.9 kg (132 lb). Her oral temperature is 97.5 °F (36.4 °C). Her blood pressure is 165/74 (abnormal) and her pulse is 76. Her respiration is 18 and oxygen saturation is 94% (abnormal).     Chief Complaint: Urinary Frequency    89-year-old female with a history of arthritis, hypothyroidism, paroxysmal atrial fibrillation on chronic Eliquis 2.5 mg q.12 hours, osteoporosis, overactive bladder, osteoporosis, and other comorbidities who presents to urgent care clinic for evaluation.  Symptoms started yesterday with bladder pressure and increased urinary frequency that is more than normal.  No painful urination, fever, chills, flank pain, hematuria, or urine odor.  No other associated symptoms.  She did collect a urine sample home to bring into clinic to be tested.  She wants to send out urine culture and does not want to be treated with antibiotics unless she has urinary tract infection.    Dysuria   This is a new problem. The current episode started yesterday. The problem has been unchanged. The pain is at a severity of 0/10. The patient is experiencing no pain. There has been no fever. Associated symptoms include frequency. Pertinent negatives include no behavior changes, chills, discharge, flank pain, hematuria, hesitancy, nausea, possible pregnancy, sweats, urgency, vomiting, weight loss, bubble bath use, constipation, rash or withholding. She has tried nothing for the symptoms. The treatment provided no relief.     Constitution: Negative for chills.   HENT:  Negative for congestion.    Cardiovascular:  Negative for chest pain, leg swelling, palpitations and sob on exertion.   Respiratory:  Negative for shortness of breath.    Gastrointestinal:  Negative for nausea, vomiting and constipation.   Genitourinary:  Positive for frequency. Negative for dysuria, urgency, flank pain, hematuria, history of  kidney stones, vaginal pain, vaginal discharge, vaginal bleeding, vaginal odor, painful intercourse, genital sore and pelvic pain.   Musculoskeletal:  Negative for back pain and muscle ache.   Skin:  Negative for rash.   Neurological:  Negative for dizziness, history of vertigo, light-headedness, passing out, facial drooping, speech difficulty, tingling and seizures.      Objective:     Physical Exam   Constitutional: She appears well-developed. She is cooperative.  Non-toxic appearance. She does not appear ill. No distress.   HENT:   Head: Normocephalic and atraumatic.   Ears:   Right Ear: Hearing and external ear normal.   Left Ear: Hearing and external ear normal.   Nose: Nose normal.   Mouth/Throat: Oropharynx is clear and moist. Mucous membranes are moist. Oropharynx is clear.   Eyes: Conjunctivae, EOM and lids are normal. Right eye exhibits no discharge. Left eye exhibits no discharge. vision grossly intact gaze aligned appropriately   Neck: Neck supple. No neck rigidity present.   Cardiovascular: Normal rate, regular rhythm and normal pulses.   Pulmonary/Chest: Effort normal. No accessory muscle usage. No respiratory distress. She has no wheezes.   Abdominal: Normal appearance. She exhibits no distension. There is no abdominal tenderness. There is no rebound, no guarding, no left CVA tenderness and no right CVA tenderness.   Musculoskeletal: Normal range of motion.         General: Normal range of motion.      Right lower leg: No edema.      Left lower leg: No edema.      Comments: Moves all extremities with normal tone, strength, and ROM.   Neurological: no focal deficit. She is alert and at baseline. She has normal motor skills and normal sensation. She displays no weakness, facial symmetry and normal reflexes. No sensory deficit. She exhibits normal muscle tone. Gait and coordination normal. Coordination normal.   Skin: Skin is warm, dry, not diaphoretic and no rash. Capillary refill takes less than 2  seconds.   Psychiatric: Her behavior is normal. Mood and thought content normal.   Nursing note and vitals reviewed.    Results for orders placed or performed in visit on 07/15/25   POCT Urinalysis(Instrument)    Collection Time: 07/15/25 10:07 AM   Result Value Ref Range    Color, POC UA Yellow Yellow, Straw, Colorless    Clarity, POC UA Clear Clear    Glucose, POC UA Negative Negative    Bilirubin, POC UA Negative Negative    Ketones, POC UA Negative Negative    Spec Grav POC UA 1.020 1.005 - 1.030    Blood, POC UA Large (A) Negative    pH, POC UA 5.5 5.0 - 8.0    Protein, POC UA Trace (A) Negative    Urobilinogen, POC UA 0.2 <=1.0    Nitrite, POC UA Negative Negative    WBC, POC UA Negative Negative         Assessment:     1. UTI symptoms    2. OAB (overactive bladder)      Note dictated with voice recognition software, please excuse any grammatical errors.    Patient presents with clinical exam findings and history consistent with above.  We discussed the differential diagnosis.    On exam, patient is nontoxic appearing and vitals are stable.  Patient is essentially neurovascularly intact on exam.  No CVA tenderness or systemic symptoms.    Diagnostic testing results were independently reviewed and interpreted, which were discussed in depth with patient.   Test ordered in clinic:  UA with large blood and trace protein.    Urine culture sent  Additionally, previous progress notes/admissions/lab were reviewed and interpreted.  CMP 04/11/2025 and 05/01/2024 with good kidney function and EGFR greater than 50 most recently.  Outside facility PCP progress note 04/28/2025 reviewed    We had shared medical decision making for patient's treatment and care.  Patient has chronic medical conditions that affected my medical decision making and treatment plan for today's visit.  It also impacted the medications that were able to be prescribed for the visit today.    Plan:     Given her age and comorbidities, I did offer  Macrobid course to start for UTI symptoms.  She would like to hold off until urine culture returns in 3 days.  If no bacteria, she will not start antibiotic.  This is reasonable.  She does have my chart we will contact her with lab results when they return.    UTI symptoms  -     POCT Urinalysis(Instrument)  -     Urine Culture High Risk ($$)  -     nitrofurantoin, macrocrystal-monohydrate, (MACROBID) 100 MG capsule; Take 1 capsule (100 mg total) by mouth 2 (two) times daily. Take with food and increased oral hydration while on this medication; supplement with probiotics/yogurt for 7 days  Dispense: 14 capsule; Refill: 0    OAB (overactive bladder)    -continue doxepin 10 mg nightly as needed.  -continue current medication regimen.  Close monitoring daily and follow up PCP for chronic medical management.      Note dictated with voice recognition software, please excuse any grammatical errors.    We had shared decision making for patient's treatment. We discussed side effects/alternatives/benefits/risk and patient would like to proceed with treatment plan. We discussed prescribed and also discussed other OTC treatment recommendations.    Patient was counseled, explained with the test results meaning, expected course, and answered all of questions. They can also receive results via my chart.      Patient was instructed to return for re-evaluation with urgent care or PCP for continued outpatient workup and management if symptoms do not improve/worsening symptoms. Strict ED versus clinic precautions given in depth.  Medical treatment guidelines, discharge and follow-up instructions given verbally/printed; patient will also receive via [facility]. Patient verbalized understanding and agreed with the entirety of plan of care.

## 2025-07-15 NOTE — PATIENT INSTRUCTIONS
PLEASE READ YOUR DISCHARGE INSTRUCTIONS ENTIRELY AS IT CONTAINS IMPORTANT INFORMATION.  - A urine culture was sent. You will be contacted once it results and appropriate action will be taken if needed.  We will notify patient of the results in the next 3-5 days; can receive results on [facility].   - Drink plenty of fluids, wipe front to back, take showers not baths, no scented soaps, wear breathable cotton underwear, urinate after sexual intercourse.   - Tylenol or Ibuprofen as directed as needed for pain.    - If you were prescribed antibiotics, please take them to completion. Please supplement with OTC probiotics and yogurt.   -You must understand that you've received an Urgent Care treatment only and that you may be released before all your medical problems are known or treated. You, the patient, will arrange for follow up care as instructed. Please arrange follow up with your primary medical clinic within 2-5 days if your signs and symptoms have not resolved or worsen. Please go to the ER for worsening symptoms including worsening fever, flank pain, vomiting, unable to tolerate fluids, fatigue, etc.   - Follow up with your PCP or specialty clinic as directed in next 2-5 days for re-evaluation.  You can call [phone] to schedule an appointment with the appropriate provider.    - If your condition worsens or fails to improve we recommend that you receive another evaluation at the emergency room immediately or contact your primary medical clinic to discuss your concerns.     RED FLAGS/WARNING SYMPTOMS DISCUSSED WITH PATIENT THAT WOULD WARRANT EMERGENT MEDICAL ATTENTION. Patient aware and verbalized understanding.        Bladder Infection, Female (Adult)    Urine is normally doesn't have any bacteria in it. But bacteria can get into the urinary tract from the skin around the rectum. Or they can travel in the blood from elsewhere in the body. Once they are in your urinary tract, they can cause infection in the  "urethra (urethritis), the bladder (cystitis), or the kidneys (pyelonephritis).  The most common place for an infection is in the bladder. This is called a bladder infection. This is one of the most common infections in women. Most bladder infections are easily treated. They are not serious unless the infection spreads to the kidney.  The phrases "bladder infection," "UTI," and "cystitis" are often used to describe the same thing. But they are not always the same. Cystitis is an inflammation of the bladder. The most common cause of cystitis is an infection.  Symptoms  The infection causes inflammation in the urethra and bladder. This causes many of the symptoms. The most common symptoms of a bladder infection are:  Pain or burning when urinating  Having to urinate more often than usual  Urgent need to urinate  Only a small amount of urine comes out  Blood in urine  Abdominal discomfort. This is usually in the lower abdomen above the pubic bone.  Cloudy urine  Strong- or bad-smelling urine  Unable to urinate (urinary retention)  Unable to hold urine in (urinary incontinence)  Fever  Loss of appetite  Confusion (in older adults)  Causes  Bladder infections are not contagious. You can't get one from someone else, from a toilet seat, or from sharing a bath.  The most common cause of bladder infections is bacteria from the bowels. The bacteria get onto the skin around the opening of the urethra. From there, they can get into the urine and travel up to the bladder, causing inflammation and infection. This usually happens because of:  Wiping improperly after urinating. Always wipe from front to back.  Bowel incontinence  Pregnancy  Procedures such as having a catheter inserted  Older age  Not emptying your bladder. This can allow bacteria a chance to grow in your urine.  Dehydration  Constipation  Sex  Use of a diaphragm for birth control   Treatment  Bladder infections are diagnosed by a urine test. They are treated with " antibiotics and usually clear up quickly without complications. Treatment helps prevent a more serious kidney infection.  Medicines  Medicines can help in the treatment of a bladder infection:  Take antibiotics until they are used up, even if you feel better. It is important to finish them to make sure the infection has cleared.  You can use acetaminophen or ibuprofen for pain, fever, or discomfort, unless another medicine was prescribed. If you have chronic liver or kidney disease, talk with your healthcare provider before using these medicines. Also talk with your provider if you've ever had a stomach ulcer or gastrointestinal bleeding, or are taking blood-thinner medicines.  If you are given phenazopydridine to reduce burning with urination, it will cause your urine to become a bright orange color. This can stain clothing.  Care and prevention  These self-care steps can help prevent future infections:  Drink plenty of fluids to prevent dehydration and flush out your bladder. Do this unless you must restrict fluids for other health reasons, or your doctor told you not to.  Proper cleaning after going to the bathroom is important. Wipe from front to back after using the toilet to prevent the spread of bacteria.  Urinate more often. Don't try to hold urine in for a long time.  Wear loose-fitting clothes and cotton underwear. Avoid tight-fitting pants.  Improve your diet and prevent constipation. Eat more fresh fruit and vegetables, and fiber, and less junk and fatty foods.  Avoid sex until your symptoms are gone.  Avoid caffeine, alcohol, and spicy foods. These can irritate your bladder.  Urinate right after intercourse to flush out your bladder.  If you use birth control pills and have frequent bladder infections, discuss it with your doctor.  Follow-up care  Call your healthcare provider if all symptoms are not gone after 3 days of treatment. This is especially important if you have repeat infections.  If a  culture was done, you will be told if your treatment needs to be changed. If directed, you can call to find out the results.  If X-rays were done, you will be told if the results will affect your treatment.  Call 911  Call 911 if any of the following occur:  Trouble breathing  Hard to wake up or confusion  Fainting or loss of consciousness  Rapid heart rate  When to seek medical advice  Call your healthcare provider right away if any of these occur:  Fever of 100.4ºF (38.0ºC) or higher, or as directed by your healthcare provider  Symptoms are not better by the third day of treatment  Back or belly (abdominal) pain that gets worse  Repeated vomiting, or unable to keep medicine down  Weakness or dizziness  Vaginal discharge  Pain, redness, or swelling in the outer vaginal area (labia)  Date Last Reviewed: 10/1/2016  © 5418-1155 [employer]. [address] [place] [address], [place] [address]. All rights reserved. This information is not intended as a substitute for professional medical care. Always follow your healthcare professional's instructions.

## 2025-07-16 LAB — BACTERIA UR CULT: NORMAL

## 2025-07-17 ENCOUNTER — PATIENT MESSAGE (OUTPATIENT)
Dept: URGENT CARE | Facility: CLINIC | Age: OVER 89
End: 2025-07-17
Payer: MEDICARE